# Patient Record
Sex: FEMALE | Race: WHITE | Employment: PART TIME | URBAN - NONMETROPOLITAN AREA
[De-identification: names, ages, dates, MRNs, and addresses within clinical notes are randomized per-mention and may not be internally consistent; named-entity substitution may affect disease eponyms.]

---

## 2017-04-07 DIAGNOSIS — I10 ESSENTIAL HYPERTENSION: ICD-10-CM

## 2017-04-07 RX ORDER — METOPROLOL SUCCINATE 25 MG/1
25 TABLET, EXTENDED RELEASE ORAL DAILY
Qty: 30 TABLET | Refills: 11 | Status: SHIPPED | OUTPATIENT
Start: 2017-04-07 | End: 2018-04-09 | Stop reason: SDUPTHER

## 2017-05-08 DIAGNOSIS — I10 ESSENTIAL HYPERTENSION: ICD-10-CM

## 2017-05-08 RX ORDER — DICLOFENAC SODIUM 75 MG/1
75 TABLET, DELAYED RELEASE ORAL 2 TIMES DAILY
Qty: 60 TABLET | Refills: 3 | Status: SHIPPED | OUTPATIENT
Start: 2017-05-08 | End: 2017-12-19 | Stop reason: SDUPTHER

## 2017-05-08 RX ORDER — LOSARTAN POTASSIUM AND HYDROCHLOROTHIAZIDE 25; 100 MG/1; MG/1
1 TABLET ORAL DAILY
Qty: 30 TABLET | Refills: 11 | Status: SHIPPED | OUTPATIENT
Start: 2017-05-08 | End: 2018-05-10 | Stop reason: SDUPTHER

## 2018-03-22 ENCOUNTER — OFFICE VISIT (OUTPATIENT)
Dept: FAMILY MEDICINE CLINIC | Age: 48
End: 2018-03-22
Payer: COMMERCIAL

## 2018-03-22 VITALS
RESPIRATION RATE: 14 BRPM | HEIGHT: 65 IN | DIASTOLIC BLOOD PRESSURE: 68 MMHG | SYSTOLIC BLOOD PRESSURE: 119 MMHG | HEART RATE: 110 BPM

## 2018-03-22 DIAGNOSIS — S83.282A TEAR OF LATERAL MENISCUS OF LEFT KNEE, CURRENT, UNSPECIFIED TEAR TYPE, INITIAL ENCOUNTER: Primary | ICD-10-CM

## 2018-03-22 PROCEDURE — 99213 OFFICE O/P EST LOW 20 MIN: CPT | Performed by: NURSE PRACTITIONER

## 2018-03-22 ASSESSMENT — ENCOUNTER SYMPTOMS
GASTROINTESTINAL NEGATIVE: 1
RESPIRATORY NEGATIVE: 1
EYES NEGATIVE: 1

## 2018-03-22 ASSESSMENT — PATIENT HEALTH QUESTIONNAIRE - PHQ9
2. FEELING DOWN, DEPRESSED OR HOPELESS: 0
SUM OF ALL RESPONSES TO PHQ9 QUESTIONS 1 & 2: 0
1. LITTLE INTEREST OR PLEASURE IN DOING THINGS: 0
SUM OF ALL RESPONSES TO PHQ QUESTIONS 1-9: 0

## 2018-03-22 NOTE — PROGRESS NOTES
Normocephalic. Right Ear: Tympanic membrane and external ear normal.   Left Ear: Tympanic membrane and external ear normal.   Nose: Nose normal.   Mouth/Throat: Oropharynx is clear and moist.   Neck: Normal range of motion. Neck supple. Cardiovascular: Normal rate, regular rhythm, normal heart sounds and intact distal pulses. Exam reveals no gallop and no friction rub. No murmur heard. Pulmonary/Chest: Effort normal and breath sounds normal. She has no wheezes. She has no rales. Abdominal: Soft. Bowel sounds are normal. There is no tenderness. There is no guarding. Musculoskeletal:        Left knee: She exhibits decreased range of motion and abnormal meniscus. Tenderness found. Lateral joint line tenderness noted. Lymphadenopathy:     She has no cervical adenopathy. Neurological: She is alert and oriented to person, place, and time. She has normal reflexes. Skin: Skin is warm. Psychiatric: She has a normal mood and affect. Assessment:      1.  Tear of lateral meniscus of left knee, current, unspecified tear type, initial encounter  MRI KNEE LEFT WO CONTRAST           Plan:      Suspect lateral meniscal tear  Will get mri and take corrective action  Discussed knee brace, ice, crutches

## 2018-04-02 ENCOUNTER — TELEPHONE (OUTPATIENT)
Dept: FAMILY MEDICINE CLINIC | Age: 48
End: 2018-04-02

## 2018-04-02 DIAGNOSIS — M25.569 ACUTE KNEE PAIN, UNSPECIFIED LATERALITY: Primary | ICD-10-CM

## 2018-04-02 RX ORDER — TRAMADOL HYDROCHLORIDE 50 MG/1
50 TABLET ORAL EVERY 6 HOURS PRN
Qty: 24 TABLET | Refills: 0 | Status: SHIPPED | OUTPATIENT
Start: 2018-04-02 | End: 2018-05-10 | Stop reason: SDUPTHER

## 2018-04-03 ENCOUNTER — TELEPHONE (OUTPATIENT)
Dept: FAMILY MEDICINE CLINIC | Age: 48
End: 2018-04-03

## 2018-04-05 ENCOUNTER — HOSPITAL ENCOUNTER (OUTPATIENT)
Dept: MRI IMAGING | Age: 48
Discharge: HOME | End: 2018-04-05
Payer: COMMERCIAL

## 2018-04-05 ENCOUNTER — TELEPHONE (OUTPATIENT)
Dept: FAMILY MEDICINE CLINIC | Age: 48
End: 2018-04-05

## 2018-04-05 DIAGNOSIS — S80.02XA CONTUSION OF LEFT KNEE, INITIAL ENCOUNTER: ICD-10-CM

## 2018-04-05 DIAGNOSIS — S83.282A TEAR OF LATERAL MENISCUS OF LEFT KNEE, CURRENT, UNSPECIFIED TEAR TYPE, INITIAL ENCOUNTER: ICD-10-CM

## 2018-04-05 DIAGNOSIS — M25.562 ARTHRALGIA OF KNEE, LEFT: Primary | ICD-10-CM

## 2018-04-05 PROCEDURE — 73721 MRI JNT OF LWR EXTRE W/O DYE: CPT

## 2018-04-09 DIAGNOSIS — I10 ESSENTIAL HYPERTENSION: ICD-10-CM

## 2018-04-09 RX ORDER — METOPROLOL SUCCINATE 25 MG/1
TABLET, EXTENDED RELEASE ORAL
Qty: 30 TABLET | Refills: 0 | Status: SHIPPED | OUTPATIENT
Start: 2018-04-09 | End: 2018-05-10 | Stop reason: SDUPTHER

## 2018-04-10 ENCOUNTER — HOSPITAL ENCOUNTER (OUTPATIENT)
Age: 48
Discharge: HOME | End: 2018-04-10
Payer: COMMERCIAL

## 2018-04-10 PROCEDURE — 87205 SMEAR GRAM STAIN: CPT

## 2018-04-10 PROCEDURE — 87070 CULTURE OTHR SPECIMN AEROBIC: CPT

## 2018-04-12 LAB
AEROBIC CULTURE: NORMAL
GRAM STAIN RESULT: NORMAL

## 2018-04-23 ENCOUNTER — HOSPITAL ENCOUNTER (OUTPATIENT)
Dept: MAMMOGRAPHY | Age: 48
Discharge: HOME | End: 2018-04-23
Payer: COMMERCIAL

## 2018-04-23 DIAGNOSIS — Z12.39 SCREENING BREAST EXAMINATION: ICD-10-CM

## 2018-04-23 PROCEDURE — 77063 BREAST TOMOSYNTHESIS BI: CPT

## 2018-05-08 ENCOUNTER — HOSPITAL ENCOUNTER (OUTPATIENT)
Age: 48
Discharge: HOME | End: 2018-05-08
Payer: COMMERCIAL

## 2018-05-08 ENCOUNTER — HOSPITAL ENCOUNTER (OUTPATIENT)
Dept: GENERAL RADIOLOGY | Age: 48
Discharge: HOME | End: 2018-05-08
Payer: COMMERCIAL

## 2018-05-08 DIAGNOSIS — M25.562 LEFT KNEE PAIN, UNSPECIFIED CHRONICITY: ICD-10-CM

## 2018-05-08 PROCEDURE — 73564 X-RAY EXAM KNEE 4 OR MORE: CPT

## 2018-05-10 ENCOUNTER — TELEPHONE (OUTPATIENT)
Dept: FAMILY MEDICINE CLINIC | Age: 48
End: 2018-05-10

## 2018-05-10 DIAGNOSIS — M25.569 ACUTE KNEE PAIN, UNSPECIFIED LATERALITY: ICD-10-CM

## 2018-05-10 DIAGNOSIS — I10 ESSENTIAL HYPERTENSION: ICD-10-CM

## 2018-05-10 RX ORDER — TRAMADOL HYDROCHLORIDE 50 MG/1
50 TABLET ORAL EVERY 6 HOURS PRN
Qty: 24 TABLET | Refills: 0 | Status: SHIPPED | OUTPATIENT
Start: 2018-05-10 | End: 2018-05-20

## 2018-05-10 RX ORDER — LOSARTAN POTASSIUM AND HYDROCHLOROTHIAZIDE 25; 100 MG/1; MG/1
TABLET ORAL
Qty: 30 TABLET | Refills: 0 | Status: SHIPPED | OUTPATIENT
Start: 2018-05-10 | End: 2018-06-20 | Stop reason: SDUPTHER

## 2018-05-10 RX ORDER — DICLOFENAC SODIUM 75 MG/1
TABLET, DELAYED RELEASE ORAL
Qty: 60 TABLET | Refills: 0 | Status: SHIPPED | OUTPATIENT
Start: 2018-05-10 | End: 2018-06-28 | Stop reason: SDUPTHER

## 2018-05-10 RX ORDER — METOPROLOL SUCCINATE 25 MG/1
TABLET, EXTENDED RELEASE ORAL
Qty: 30 TABLET | Refills: 0 | Status: SHIPPED | OUTPATIENT
Start: 2018-05-10 | End: 2018-06-20 | Stop reason: SDUPTHER

## 2018-06-28 RX ORDER — DICLOFENAC SODIUM 75 MG/1
TABLET, DELAYED RELEASE ORAL
Qty: 60 TABLET | Refills: 5 | Status: SHIPPED | OUTPATIENT
Start: 2018-06-28 | End: 2019-06-10 | Stop reason: SDUPTHER

## 2018-06-28 RX ORDER — DICLOFENAC SODIUM 75 MG/1
TABLET, DELAYED RELEASE ORAL
Qty: 60 TABLET | Refills: 0 | Status: SHIPPED | OUTPATIENT
Start: 2018-06-28 | End: 2021-07-01

## 2018-12-13 ENCOUNTER — HOSPITAL ENCOUNTER (EMERGENCY)
Age: 48
Discharge: HOME | End: 2018-12-14
Attending: EMERGENCY MEDICINE
Payer: COMMERCIAL

## 2018-12-13 DIAGNOSIS — R10.9 ABDOMINAL PAIN, UNSPECIFIED ABDOMINAL LOCATION: Primary | ICD-10-CM

## 2018-12-13 DIAGNOSIS — M54.50 ACUTE BILATERAL LOW BACK PAIN WITHOUT SCIATICA: ICD-10-CM

## 2018-12-13 PROCEDURE — 99284 EMERGENCY DEPT VISIT MOD MDM: CPT

## 2018-12-13 PROCEDURE — 2709999900 HC NON-CHARGEABLE SUPPLY

## 2018-12-13 RX ORDER — KETOROLAC TROMETHAMINE 30 MG/ML
15 INJECTION, SOLUTION INTRAMUSCULAR; INTRAVENOUS ONCE
Status: COMPLETED | OUTPATIENT
Start: 2018-12-14 | End: 2018-12-14

## 2018-12-13 RX ORDER — FOLIC ACID 1 MG/1
1 TABLET ORAL DAILY
COMMUNITY

## 2018-12-13 RX ORDER — ONDANSETRON 2 MG/ML
4 INJECTION INTRAMUSCULAR; INTRAVENOUS ONCE
Status: COMPLETED | OUTPATIENT
Start: 2018-12-14 | End: 2018-12-14

## 2018-12-13 ASSESSMENT — PAIN DESCRIPTION - ORIENTATION: ORIENTATION: LOWER

## 2018-12-13 ASSESSMENT — PAIN SCALES - GENERAL: PAINLEVEL_OUTOF10: 8

## 2018-12-13 ASSESSMENT — PAIN DESCRIPTION - LOCATION: LOCATION: BACK

## 2018-12-13 ASSESSMENT — PAIN DESCRIPTION - PAIN TYPE: TYPE: ACUTE PAIN

## 2018-12-14 ENCOUNTER — APPOINTMENT (OUTPATIENT)
Dept: CT IMAGING | Age: 48
End: 2018-12-14
Payer: COMMERCIAL

## 2018-12-14 VITALS
BODY MASS INDEX: 36.65 KG/M2 | SYSTOLIC BLOOD PRESSURE: 121 MMHG | DIASTOLIC BLOOD PRESSURE: 62 MMHG | OXYGEN SATURATION: 95 % | HEIGHT: 65 IN | WEIGHT: 220 LBS | TEMPERATURE: 97.8 F | HEART RATE: 87 BPM | RESPIRATION RATE: 16 BRPM

## 2018-12-14 LAB
ALBUMIN SERPL-MCNC: 3.5 GM/DL (ref 3.4–5)
ALP BLD-CCNC: 77 U/L (ref 46–116)
ALT SERPL-CCNC: 28 U/L (ref 14–63)
AMPHETAMINE+METHAMPHETAMIE: NEGATIVE
ANION GAP: 13 MEQ/L (ref 8–16)
AST SERPL-CCNC: 20 U/L (ref 15–37)
BARBITURATES: NEGATIVE
BASOPHILS # BLD: 0.6 % (ref 0–3)
BENZODIAZEPINES: NEGATIVE
BILIRUB SERPL-MCNC: 0.1 MG/DL (ref 0.2–1)
BILIRUBIN URINE: NEGATIVE
BLOOD, URINE: NEGATIVE
BUN BLDV-MCNC: 15 MG/DL (ref 7–18)
CANNABINOIDS: NEGATIVE
CHARACTER, URINE: CLEAR
CHLORIDE BLD-SCNC: 97 MEQ/L (ref 98–107)
CO2: 26 MEQ/L (ref 21–32)
COCAINE METABOLITE: NEGATIVE
COLOR: YELLOW
CREAT SERPL-MCNC: 0.9 MG/DL (ref 0.6–1.3)
EOSINOPHILS RELATIVE PERCENT: 2.5 % (ref 0–4)
GFR, ESTIMATED: 71 ML/MIN/1.73M2
GLUCOSE BLD-MCNC: 142 MG/DL (ref 74–106)
GLUCOSE, URINE: NEGATIVE MG/DL
HCT VFR BLD CALC: 34.9 % (ref 37–47)
HEMOGLOBIN: 11.8 GM/DL (ref 12–16)
KETONES, URINE: NEGATIVE
LEUKOCYTE ESTERASE, URINE: NEGATIVE
LIPASE: 169 U/L (ref 73–393)
LYMPHOCYTES # BLD: 34 % (ref 15–47)
MCH RBC QN AUTO: 28 PG (ref 27–31)
MCHC RBC AUTO-ENTMCNC: 33.9 GM/DL (ref 33–37)
MCV RBC AUTO: 82.5 FL (ref 81–99)
MONOCYTES: 6 % (ref 0–12)
NITRITE, URINE: NEGATIVE
OPIATES: NEGATIVE
PDW BLD-RTO: 14.5 % (ref 11.5–14.5)
PH UA: 7 (ref 5–9)
PHENCYCLIDINE: NEGATIVE
PLATELET # BLD: 327 THOU/MM3 (ref 130–400)
PMV BLD AUTO: 7.6 FL (ref 7.4–10.4)
POC CALCIUM: 9.2 MG/DL (ref 8.5–10.1)
POTASSIUM SERPL-SCNC: 3.7 MEQ/L (ref 3.5–5.1)
PREGNANCY, URINE: NEGATIVE
PROTEIN UA: NEGATIVE MG/DL
RBC # BLD: 4.23 MILL/MM3 (ref 4.2–5.4)
REFLEX TO URINE C & S: NORMAL
SEGS: 56.9 % (ref 43–75)
SODIUM BLD-SCNC: 136 MEQ/L (ref 136–145)
SPECIFIC GRAVITY UA: 1.02 (ref 1–1.03)
TOTAL PROTEIN: 7.5 GM/DL (ref 6.4–8.2)
UROBILINOGEN, URINE: 0.2 EU/DL (ref 0–1)
WBC # BLD: 7.6 THOU/MM3 (ref 4.8–10.8)

## 2018-12-14 PROCEDURE — 81001 URINALYSIS AUTO W/SCOPE: CPT

## 2018-12-14 PROCEDURE — 36415 COLL VENOUS BLD VENIPUNCTURE: CPT

## 2018-12-14 PROCEDURE — 6360000002 HC RX W HCPCS: Performed by: EMERGENCY MEDICINE

## 2018-12-14 PROCEDURE — 85025 COMPLETE CBC W/AUTO DIFF WBC: CPT

## 2018-12-14 PROCEDURE — 96374 THER/PROPH/DIAG INJ IV PUSH: CPT

## 2018-12-14 PROCEDURE — 6370000000 HC RX 637 (ALT 250 FOR IP): Performed by: EMERGENCY MEDICINE

## 2018-12-14 PROCEDURE — 96375 TX/PRO/DX INJ NEW DRUG ADDON: CPT

## 2018-12-14 PROCEDURE — 83690 ASSAY OF LIPASE: CPT

## 2018-12-14 PROCEDURE — 2709999900 HC NON-CHARGEABLE SUPPLY

## 2018-12-14 PROCEDURE — 84703 CHORIONIC GONADOTROPIN ASSAY: CPT

## 2018-12-14 PROCEDURE — 74176 CT ABD & PELVIS W/O CONTRAST: CPT

## 2018-12-14 PROCEDURE — 80305 DRUG TEST PRSMV DIR OPT OBS: CPT

## 2018-12-14 PROCEDURE — 80053 COMPREHEN METABOLIC PANEL: CPT

## 2018-12-14 RX ORDER — ONDANSETRON 2 MG/ML
4 INJECTION INTRAMUSCULAR; INTRAVENOUS ONCE
Status: COMPLETED | OUTPATIENT
Start: 2018-12-14 | End: 2018-12-14

## 2018-12-14 RX ORDER — MORPHINE SULFATE 4 MG/ML
4 INJECTION, SOLUTION INTRAMUSCULAR; INTRAVENOUS
Status: DISCONTINUED | OUTPATIENT
Start: 2018-12-14 | End: 2018-12-14 | Stop reason: HOSPADM

## 2018-12-14 RX ADMIN — ONDANSETRON HYDROCHLORIDE 4 MG: 2 SOLUTION INTRAMUSCULAR; INTRAVENOUS at 00:22

## 2018-12-14 RX ADMIN — MORPHINE SULFATE 4 MG: 4 INJECTION, SOLUTION INTRAMUSCULAR; INTRAVENOUS at 00:53

## 2018-12-14 RX ADMIN — ONDANSETRON 4 MG: 2 INJECTION INTRAMUSCULAR; INTRAVENOUS at 00:51

## 2018-12-14 RX ADMIN — KETOROLAC TROMETHAMINE 15 MG: 30 INJECTION, SOLUTION INTRAMUSCULAR at 00:24

## 2018-12-14 ASSESSMENT — PAIN SCALES - GENERAL
PAINLEVEL_OUTOF10: 6
PAINLEVEL_OUTOF10: 7

## 2018-12-14 NOTE — ED PROVIDER NOTES
and results. .  Patient was reassessed and condition was improved after tx. No further needs at this time. Lab tests are unremarkable. Urinalysis is negative. Urine drug screen is negative. Pregnancy is negative. White count is normal.  Lipase is normal.  Clinical exam is otherwise benign at this time. She still has some pain and discomfort although appears more like a kidney stone type pain or discomfort although really has no CVA tenderness or other upper belly pain. CAT scan was obtained and is negative for bowel obstruction or any acute abnormalities. She's received some morphine IV here she is almost 100 percent improved at this time. 1:53 AM  Discussed with patient and family at the bedside in regards to differential diagnosis including for her upper pain. She had no chest pain or shortness of breath to suggest ischemia or other findings at this time. Discussed upper abdominal pain such as gallbladder disease or spasm. Would account for earlier upper pain and discomfort although not low back pain or other problems although can radiate and refer pain. I recommended a gallbladder ultrasound as an outpatient. I'll place an order and have follow-up with primary care. FINAL IMPRESSION    1. Abdominal pain, unspecified abdominal location    2.  Acute bilateral low back pain without sciatica         PATIENT REFERRED TO:  [facility], APRN - CNP  [address]  [phone]    Call   For evaluation      DISCHARGE MEDICATIONS:  New Prescriptions    No medications on file          Bucky Mark MD  12/14/18 4350

## 2019-01-28 DIAGNOSIS — I10 ESSENTIAL HYPERTENSION: ICD-10-CM

## 2019-01-28 RX ORDER — LOSARTAN POTASSIUM AND HYDROCHLOROTHIAZIDE 25; 100 MG/1; MG/1
TABLET ORAL
Qty: 30 TABLET | Refills: 0 | Status: SHIPPED | OUTPATIENT
Start: 2019-01-28 | End: 2019-03-07 | Stop reason: SDUPTHER

## 2019-01-28 RX ORDER — METOPROLOL SUCCINATE 25 MG/1
TABLET, EXTENDED RELEASE ORAL
Qty: 30 TABLET | Refills: 0 | Status: SHIPPED | OUTPATIENT
Start: 2019-01-28 | End: 2019-03-07 | Stop reason: SDUPTHER

## 2019-03-07 DIAGNOSIS — I10 ESSENTIAL HYPERTENSION: ICD-10-CM

## 2019-03-07 RX ORDER — LOSARTAN POTASSIUM AND HYDROCHLOROTHIAZIDE 25; 100 MG/1; MG/1
TABLET ORAL
Qty: 30 TABLET | Refills: 0 | Status: SHIPPED | OUTPATIENT
Start: 2019-03-07 | End: 2019-04-05 | Stop reason: SDUPTHER

## 2019-03-07 RX ORDER — METOPROLOL SUCCINATE 25 MG/1
TABLET, EXTENDED RELEASE ORAL
Qty: 30 TABLET | Refills: 0 | Status: SHIPPED | OUTPATIENT
Start: 2019-03-07 | End: 2019-04-05 | Stop reason: SDUPTHER

## 2019-04-29 LAB
ALT SERPL-CCNC: 23 U/L
AST SERPL-CCNC: 25 U/L
BASOPHILS ABSOLUTE: ABNORMAL /ΜL
BASOPHILS RELATIVE PERCENT: ABNORMAL %
CREAT SERPL-MCNC: 0.79 MG/DL
EOSINOPHILS ABSOLUTE: ABNORMAL /ΜL
EOSINOPHILS RELATIVE PERCENT: ABNORMAL %
HCT VFR BLD CALC: 35.4 % (ref 36–46)
HEMOGLOBIN: 11.6 G/DL (ref 12–16)
LYMPHOCYTES ABSOLUTE: ABNORMAL /ΜL
LYMPHOCYTES RELATIVE PERCENT: ABNORMAL %
MCH RBC QN AUTO: 27.2 PG
MCHC RBC AUTO-ENTMCNC: 32.7 G/DL
MCV RBC AUTO: 83.2 FL
MONOCYTES ABSOLUTE: ABNORMAL /ΜL
MONOCYTES RELATIVE PERCENT: ABNORMAL %
NEUTROPHILS ABSOLUTE: ABNORMAL /ΜL
NEUTROPHILS RELATIVE PERCENT: ABNORMAL %
PLATELET # BLD: 318 K/ΜL
PMV BLD AUTO: ABNORMAL FL
RBC # BLD: 4.26 10^6/ΜL
WBC # BLD: 6.1 10^3/ML

## 2019-05-13 ENCOUNTER — HOSPITAL ENCOUNTER (OUTPATIENT)
Dept: MAMMOGRAPHY | Age: 49
Discharge: HOME | End: 2019-05-13
Payer: COMMERCIAL

## 2019-05-13 DIAGNOSIS — Z12.39 SCREENING BREAST EXAMINATION: ICD-10-CM

## 2019-05-13 PROCEDURE — 77063 BREAST TOMOSYNTHESIS BI: CPT

## 2019-05-23 ENCOUNTER — HOSPITAL ENCOUNTER (OUTPATIENT)
Dept: WOMENS IMAGING | Age: 49
Discharge: HOME | End: 2019-05-23
Payer: COMMERCIAL

## 2019-05-23 DIAGNOSIS — R92.0 BREAST MICROCALCIFICATIONS: ICD-10-CM

## 2019-05-23 PROCEDURE — G0279 TOMOSYNTHESIS, MAMMO: HCPCS

## 2019-06-10 RX ORDER — DICLOFENAC SODIUM 75 MG/1
TABLET, DELAYED RELEASE ORAL
Qty: 60 TABLET | Refills: 0 | Status: SHIPPED | OUTPATIENT
Start: 2019-06-10 | End: 2019-07-30 | Stop reason: SDUPTHER

## 2019-07-30 RX ORDER — DICLOFENAC SODIUM 75 MG/1
TABLET, DELAYED RELEASE ORAL
Qty: 60 TABLET | Refills: 0 | Status: SHIPPED | OUTPATIENT
Start: 2019-07-30 | End: 2019-12-16

## 2019-11-04 ENCOUNTER — APPOINTMENT (OUTPATIENT)
Dept: WOMENS IMAGING | Age: 49
End: 2019-11-04
Payer: COMMERCIAL

## 2019-11-04 ENCOUNTER — HOSPITAL ENCOUNTER (OUTPATIENT)
Dept: WOMENS IMAGING | Age: 49
Discharge: HOME | End: 2019-11-04
Payer: COMMERCIAL

## 2019-11-04 DIAGNOSIS — R92.1 BREAST CALCIFICATION, RIGHT: ICD-10-CM

## 2019-11-04 DIAGNOSIS — Z09 FOLLOW-UP EXAM: ICD-10-CM

## 2019-11-04 PROCEDURE — G0279 TOMOSYNTHESIS, MAMMO: HCPCS

## 2019-11-18 ENCOUNTER — NURSE ONLY (OUTPATIENT)
Dept: LAB | Age: 49
End: 2019-11-18

## 2019-11-18 LAB
ALBUMIN SERPL-MCNC: 4.3 G/DL (ref 3.5–5.1)
ALP BLD-CCNC: 65 U/L (ref 38–126)
ALT SERPL-CCNC: 32 U/L (ref 11–66)
ANION GAP SERPL CALCULATED.3IONS-SCNC: 14 MEQ/L (ref 8–16)
AST SERPL-CCNC: 24 U/L (ref 5–40)
BASOPHILS # BLD: 0.3 %
BASOPHILS ABSOLUTE: 0 THOU/MM3 (ref 0–0.1)
BILIRUB SERPL-MCNC: 0.3 MG/DL (ref 0.3–1.2)
BUN BLDV-MCNC: 10 MG/DL (ref 7–22)
C-REACTIVE PROTEIN: 0.54 MG/DL (ref 0–1)
CALCIUM SERPL-MCNC: 9.4 MG/DL (ref 8.5–10.5)
CHLORIDE BLD-SCNC: 99 MEQ/L (ref 98–111)
CO2: 23 MEQ/L (ref 23–33)
CREAT SERPL-MCNC: 0.6 MG/DL (ref 0.4–1.2)
EOSINOPHIL # BLD: 3.2 %
EOSINOPHILS ABSOLUTE: 0.2 THOU/MM3 (ref 0–0.4)
ERYTHROCYTE [DISTWIDTH] IN BLOOD BY AUTOMATED COUNT: 14 % (ref 11.5–14.5)
ERYTHROCYTE [DISTWIDTH] IN BLOOD BY AUTOMATED COUNT: 44.7 FL (ref 35–45)
GFR SERPL CREATININE-BSD FRML MDRD: > 90 ML/MIN/1.73M2
HBV SURFACE AB TITR SER: NEGATIVE {TITER}
HCT VFR BLD CALC: 39.5 % (ref 37–47)
HEMOGLOBIN: 13 GM/DL (ref 12–16)
HEPATITIS B SURFACE ANTIGEN: NEGATIVE
HEPATITIS C ANTIBODY: NEGATIVE
IMMATURE GRANS (ABS): 0.01 THOU/MM3 (ref 0–0.07)
IMMATURE GRANULOCYTES: 0.2 %
LYMPHOCYTES # BLD: 41.2 %
LYMPHOCYTES ABSOLUTE: 2.5 THOU/MM3 (ref 1–4.8)
MCH RBC QN AUTO: 29 PG (ref 26–33)
MCHC RBC AUTO-ENTMCNC: 32.9 GM/DL (ref 32.2–35.5)
MCV RBC AUTO: 88.2 FL (ref 81–99)
MONOCYTES # BLD: 5.1 %
MONOCYTES ABSOLUTE: 0.3 THOU/MM3 (ref 0.4–1.3)
NUCLEATED RED BLOOD CELLS: 0 /100 WBC
PLATELET # BLD: 280 THOU/MM3 (ref 130–400)
PMV BLD AUTO: 9.9 FL (ref 9.4–12.4)
POTASSIUM SERPL-SCNC: 4 MEQ/L (ref 3.5–5.2)
RBC # BLD: 4.48 MILL/MM3 (ref 4.2–5.4)
RHEUMATOID FACTOR: < 10 IU/ML (ref 0–13)
SEDIMENTATION RATE, ERYTHROCYTE: 18 MM/HR (ref 0–20)
SEG NEUTROPHILS: 50 %
SEGMENTED NEUTROPHILS ABSOLUTE COUNT: 3 THOU/MM3 (ref 1.8–7.7)
SODIUM BLD-SCNC: 136 MEQ/L (ref 135–145)
TOTAL PROTEIN: 7.5 G/DL (ref 6.1–8)
URIC ACID: 6.8 MG/DL (ref 2.4–5.7)
WBC # BLD: 6 THOU/MM3 (ref 4.8–10.8)

## 2019-11-19 ENCOUNTER — NURSE ONLY (OUTPATIENT)
Dept: FAMILY MEDICINE CLINIC | Age: 49
End: 2019-11-19
Payer: COMMERCIAL

## 2019-11-19 VITALS — TEMPERATURE: 98.6 F

## 2019-11-19 DIAGNOSIS — J02.9 SORE THROAT: Primary | ICD-10-CM

## 2019-11-19 LAB — STREPTOCOCCUS A RNA: NEGATIVE

## 2019-11-19 PROCEDURE — 87651 STREP A DNA AMP PROBE: CPT | Performed by: NURSE PRACTITIONER

## 2019-11-21 LAB
ANA SCREEN: NORMAL
CYCLIC CITRULLIN PEPTIDE AB: 3 UNITS (ref 0–19)
HEPATITIS B CORE TOTAL ANTIBODY: NEGATIVE

## 2019-12-16 ENCOUNTER — OFFICE VISIT (OUTPATIENT)
Dept: FAMILY MEDICINE CLINIC | Age: 49
End: 2019-12-16
Payer: COMMERCIAL

## 2019-12-16 ENCOUNTER — HOSPITAL ENCOUNTER (OUTPATIENT)
Age: 49
Discharge: HOME | End: 2019-12-16
Payer: COMMERCIAL

## 2019-12-16 ENCOUNTER — HOSPITAL ENCOUNTER (OUTPATIENT)
Dept: GENERAL RADIOLOGY | Age: 49
Discharge: HOME | End: 2019-12-16
Payer: COMMERCIAL

## 2019-12-16 VITALS
TEMPERATURE: 97.7 F | HEIGHT: 65 IN | BODY MASS INDEX: 39.95 KG/M2 | RESPIRATION RATE: 16 BRPM | HEART RATE: 72 BPM | DIASTOLIC BLOOD PRESSURE: 80 MMHG | SYSTOLIC BLOOD PRESSURE: 130 MMHG | WEIGHT: 239.8 LBS

## 2019-12-16 DIAGNOSIS — M17.0 ARTHRITIS OF BOTH KNEES: ICD-10-CM

## 2019-12-16 DIAGNOSIS — Z79.899 ENCOUNTER FOR LONG-TERM (CURRENT) USE OF OTHER MEDICATIONS: ICD-10-CM

## 2019-12-16 DIAGNOSIS — R10.11 RUQ ABDOMINAL PAIN: ICD-10-CM

## 2019-12-16 DIAGNOSIS — M06.09 RHEUMATOID ARTHRITIS, SERONEGATIVE, MULTIPLE SITES (HCC): ICD-10-CM

## 2019-12-16 DIAGNOSIS — R10.84 GENERALIZED ABDOMINAL PAIN: Primary | ICD-10-CM

## 2019-12-16 LAB
BILIRUBIN URINE: NEGATIVE
BLOOD URINE, POC: NEGATIVE
CHARACTER, URINE: CLEAR
COLOR, URINE: YELLOW
GLUCOSE URINE: NEGATIVE MG/DL
KETONES, URINE: NEGATIVE
LEUKOCYTE CLUMPS, URINE: ABNORMAL
NITRITE, URINE: NEGATIVE
PH, URINE: 5.5 (ref 5–9)
PROTEIN, URINE: NEGATIVE MG/DL
SPECIFIC GRAVITY, URINE: 1.02 (ref 1–1.03)
UROBILINOGEN, URINE: 0.2 EU/DL (ref 0–1)

## 2019-12-16 PROCEDURE — 71046 X-RAY EXAM CHEST 2 VIEWS: CPT

## 2019-12-16 PROCEDURE — 99213 OFFICE O/P EST LOW 20 MIN: CPT | Performed by: NURSE PRACTITIONER

## 2019-12-16 PROCEDURE — 81003 URINALYSIS AUTO W/O SCOPE: CPT | Performed by: NURSE PRACTITIONER

## 2019-12-16 RX ORDER — CIPROFLOXACIN 250 MG/1
250 TABLET, FILM COATED ORAL 2 TIMES DAILY
Qty: 14 TABLET | Refills: 0 | Status: SHIPPED | OUTPATIENT
Start: 2019-12-16 | End: 2019-12-23

## 2019-12-16 SDOH — ECONOMIC STABILITY: FOOD INSECURITY: WITHIN THE PAST 12 MONTHS, THE FOOD YOU BOUGHT JUST DIDN'T LAST AND YOU DIDN'T HAVE MONEY TO GET MORE.: NEVER TRUE

## 2019-12-16 SDOH — ECONOMIC STABILITY: FOOD INSECURITY: WITHIN THE PAST 12 MONTHS, YOU WORRIED THAT YOUR FOOD WOULD RUN OUT BEFORE YOU GOT MONEY TO BUY MORE.: NEVER TRUE

## 2019-12-16 SDOH — ECONOMIC STABILITY: INCOME INSECURITY: HOW HARD IS IT FOR YOU TO PAY FOR THE VERY BASICS LIKE FOOD, HOUSING, MEDICAL CARE, AND HEATING?: NOT HARD AT ALL

## 2019-12-16 ASSESSMENT — PATIENT HEALTH QUESTIONNAIRE - PHQ9
2. FEELING DOWN, DEPRESSED OR HOPELESS: 0
1. LITTLE INTEREST OR PLEASURE IN DOING THINGS: 0
SUM OF ALL RESPONSES TO PHQ QUESTIONS 1-9: 0
SUM OF ALL RESPONSES TO PHQ QUESTIONS 1-9: 0
SUM OF ALL RESPONSES TO PHQ9 QUESTIONS 1 & 2: 0

## 2019-12-16 ASSESSMENT — ENCOUNTER SYMPTOMS
ABDOMINAL PAIN: 1
NAUSEA: 1
RESPIRATORY NEGATIVE: 1
EYES NEGATIVE: 1

## 2019-12-18 LAB
ORGANISM: ABNORMAL
URINE CULTURE, ROUTINE: ABNORMAL

## 2019-12-23 ENCOUNTER — TELEPHONE (OUTPATIENT)
Dept: FAMILY MEDICINE CLINIC | Age: 49
End: 2019-12-23

## 2019-12-23 ENCOUNTER — HOSPITAL ENCOUNTER (OUTPATIENT)
Dept: ULTRASOUND IMAGING | Age: 49
Discharge: HOME | End: 2019-12-23
Payer: COMMERCIAL

## 2019-12-23 DIAGNOSIS — R10.11 RUQ ABDOMINAL PAIN: ICD-10-CM

## 2019-12-23 DIAGNOSIS — K80.20 CALCULUS OF GALLBLADDER WITHOUT CHOLECYSTITIS WITHOUT OBSTRUCTION: ICD-10-CM

## 2019-12-23 DIAGNOSIS — R10.11 RUQ ABDOMINAL PAIN: Primary | ICD-10-CM

## 2019-12-23 PROCEDURE — 76705 ECHO EXAM OF ABDOMEN: CPT

## 2020-01-13 ENCOUNTER — HOSPITAL ENCOUNTER (OUTPATIENT)
Age: 50
Discharge: HOME | End: 2020-01-13
Payer: COMMERCIAL

## 2020-01-13 ENCOUNTER — OFFICE VISIT (OUTPATIENT)
Dept: SURGERY | Age: 50
End: 2020-01-13
Payer: COMMERCIAL

## 2020-01-13 VITALS
HEART RATE: 84 BPM | DIASTOLIC BLOOD PRESSURE: 64 MMHG | WEIGHT: 239.5 LBS | TEMPERATURE: 98.4 F | RESPIRATION RATE: 18 BRPM | HEIGHT: 65 IN | SYSTOLIC BLOOD PRESSURE: 120 MMHG | OXYGEN SATURATION: 96 % | BODY MASS INDEX: 39.9 KG/M2

## 2020-01-13 LAB
ALBUMIN SERPL-MCNC: 4.5 G/DL (ref 3.5–5.1)
ALP BLD-CCNC: 61 U/L (ref 38–126)
ALT SERPL-CCNC: 27 U/L (ref 11–66)
AST SERPL-CCNC: 19 U/L (ref 5–40)
BILIRUB SERPL-MCNC: 0.2 MG/DL (ref 0.3–1.2)
BILIRUBIN DIRECT: < 0.2 MG/DL (ref 0–0.3)
EKG ATRIAL RATE: 74 BPM
EKG P AXIS: 60 DEGREES
EKG P-R INTERVAL: 160 MS
EKG Q-T INTERVAL: 398 MS
EKG QRS DURATION: 80 MS
EKG QTC CALCULATION (BAZETT): 441 MS
EKG R AXIS: 31 DEGREES
EKG T AXIS: 8 DEGREES
EKG VENTRICULAR RATE: 74 BPM
TOTAL PROTEIN: 7.6 G/DL (ref 6.1–8)

## 2020-01-13 PROCEDURE — 99243 OFF/OP CNSLTJ NEW/EST LOW 30: CPT | Performed by: SURGERY

## 2020-01-13 PROCEDURE — 80076 HEPATIC FUNCTION PANEL: CPT

## 2020-01-13 PROCEDURE — 36415 COLL VENOUS BLD VENIPUNCTURE: CPT

## 2020-01-13 PROCEDURE — 93010 ELECTROCARDIOGRAM REPORT: CPT | Performed by: NUCLEAR MEDICINE

## 2020-01-13 PROCEDURE — 93005 ELECTROCARDIOGRAM TRACING: CPT

## 2020-01-13 RX ORDER — PANTOPRAZOLE SODIUM 40 MG/1
40 TABLET, DELAYED RELEASE ORAL
Qty: 30 TABLET | Refills: 0 | Status: SHIPPED | OUTPATIENT
Start: 2020-01-13 | End: 2020-01-30

## 2020-01-13 RX ORDER — MELOXICAM 15 MG/1
15 TABLET ORAL DAILY
COMMUNITY
End: 2020-05-21

## 2020-01-13 NOTE — PROGRESS NOTES
Emili Joseph MD   General Surgery  New Patient Evaluation in Office  Pt Name: Kasandra Echevarria  Date of Birth 1970   Today's Date: 1/13/2020  Medical Record Number: [id number]  Referring Provider: GERALDINE Mcintyre -*  Primary Care Provider: GERALDINE Robledo - CNP  Chief Complaint:  Chief Complaint   Patient presents with   [facility] Surgical Consult     new patient--ref by KAMLA Carter for gallstones       ASSESSMENT      1. Calculus of gallbladder without cholecystitis without obstruction    2. Essential hypertension    3. Pre-op testing    4. Gastroesophageal reflux disease, esophagitis presence not specified         PLANS      1. Schedule patient for robotic assisted laparoscopic cholecystectomy for symptomatic cholelithiasis. 2.  Prescription for Protonix for reflux symptomatology and GI prophylaxis. Patient taking NSAIDs for her rheumatoid arthritis. 3.  Risks of procedure and advantages disadvantages use of robotic technology were discussed. Risks include but not limited to bleeding, infection, bile duct leak, bile duct injury, persistence of symptoms postoperatively, postoperative diarrhea as well as conversion open procedure were all discussed. All questions answered. 4.  General anesthesia  5. Preoperative testing per anesthesia guidelines        Watson Recinos is a 51y.o. year old female who is presenting today in the office for evaluation of cholelithiasis with symptomatic biliary colic symptoms. States he has a history of rheumatoid arthritis she recently had meloxicam added which did start some symptoms of reflux. She has over the last year or so had intermittent episodes of epigastric and right upper quadrant pain with radiation around to the right flank. She denies any real triggers though spicy foods do seem to aggravate her symptoms. She has no known prior history of peptic ulcer disease, pancreatitis or jaundice. She had been taking Nexium over-the-counter.   She has never had an Maternal Grandmother     High Blood Pressure Maternal Grandmother     Diabetes Maternal Grandfather     Heart Disease Maternal Grandfather     High Cholesterol Maternal Grandfather     No Known Problems Brother     Arthritis Paternal Grandmother         RA    Diabetes Paternal Grandfather     Hypertension Paternal Grandfather        SocialHistory  Social History     Socioeconomic History    Marital status:      Spouse name: Not on file    Number of children: 3    Years of education: Not on file    Highest education level: Not on file   Occupational History    Occupation:  and    Social Needs    Financial resource strain: Not hard at all   [employer] insecurity:     Worry: Never true     Inability: Never true   OWM needs:     Medical: Not on file     Non-medical: Not on file   Tobacco Use    Smoking status: Never Smoker    Smokeless tobacco: Never Used   Substance and Sexual Activity    Alcohol use: Yes     Alcohol/week: 0.0 standard drinks     Comment: social    Drug use: No    Sexual activity: Not Currently   Lifestyle    Physical activity:     Days per week: Not on file     Minutes per session: Not on file    Stress: Not on file   Relationships    Social connections:     Talks on phone: Not on file     Gets together: Not on file     Attends Gnosticist service: Not on file     Active member of club or organization: Not on file     Attends meetings of clubs or organizations: Not on file     Relationship status: Not on file    Intimate partner violence:     Fear of current or ex partner: Not on file     Emotionally abused: Not on file     Physically abused: Not on file     Forced sexual activity: Not on file   Other Topics Concern    Not on file   Social History Narrative    Not on file           Review of Systems  Review of Systems   Constitutional: Negative for chills, fatigue, fever and unexpected weight change.    HENT: Negative for sore throat, trouble swallowing and voice change. Eyes: Negative for visual disturbance. Respiratory: Negative for cough, shortness of breath and wheezing. Cardiovascular: Negative for chest pain and palpitations. Gastrointestinal: Positive for abdominal distention, abdominal pain and nausea. Negative for blood in stool and vomiting. Endocrine: Negative for cold intolerance, heat intolerance and polydipsia. Genitourinary: Negative for dysuria, flank pain and hematuria. Musculoskeletal: Positive for arthralgias and myalgias. Negative for gait problem and joint swelling. Skin: Negative for color change and rash. Allergic/Immunologic: Negative for immunocompromised state. Neurological: Negative for dizziness, tremors, seizures and speech difficulty. Hematological: Does not bruise/bleed easily. Psychiatric/Behavioral: Negative for behavioral problems, confusion and suicidal ideas. OBJECTIVE     /64 (Site: Right Upper Arm, Position: Sitting, Cuff Size: Medium Adult)   Pulse 84   Temp 98.4 °F (36.9 °C)   Resp 18   Ht 5' 5\" (1.651 m)   Wt 239 lb 8 oz (108.6 kg)   LMP 12/25/2019   SpO2 96%   BMI 39.85 kg/m²      Physical Exam  Vitals signs reviewed. Constitutional:       Appearance: She is well-developed. She is not ill-appearing or diaphoretic. HENT:      Head: Normocephalic and atraumatic. Nose: No rhinorrhea. Eyes:      General: No scleral icterus. Extraocular Movements: Extraocular movements intact. Pupils: Pupils are equal, round, and reactive to light. Neck:      Musculoskeletal: Normal range of motion and neck supple. Thyroid: No thyromegaly. Vascular: No JVD. Trachea: No tracheal deviation. Cardiovascular:      Rate and Rhythm: Normal rate and regular rhythm. Heart sounds: Normal heart sounds. No murmur. Pulmonary:      Effort: Pulmonary effort is normal. No respiratory distress. Breath sounds: Normal breath sounds. No wheezing.    Abdominal: General: Bowel sounds are normal. There is no distension. Palpations: Abdomen is soft. Tenderness: There is no tenderness. There is no guarding or rebound. Hernia: No hernia is present. Musculoskeletal: Normal range of motion. Lymphadenopathy:      Cervical: No cervical adenopathy. Skin:     General: Skin is warm and dry. Findings: No rash. Neurological:      Mental Status: She is alert and oriented to person, place, and time. Cranial Nerves: No cranial nerve deficit. Lab Results   Component Value Date    WBC 6.0 11/18/2019    HGB 13.0 11/18/2019    HCT 39.5 11/18/2019     11/18/2019    ALT 27 01/13/2020    AST 19 01/13/2020     11/18/2019    K 4.0 11/18/2019    CL 99 11/18/2019    CREATININE 0.6 11/18/2019    BUN 10 11/18/2019    CO2 23 11/18/2019    TSH 1.770 10/06/2015        PROCEDURE: US GALLBLADDER RUQ       CLINICAL INFORMATION: RUQ abdominal pain .       COMPARISON: No prior study.       TECHNIQUE: Grayscale and color Doppler ultrasound       FINDINGS: Numerous echogenic foci within the gallbladder with posterior shadowing compatible with gallstones. There is no wall thickening or para cholecystic edema. Common bile duct is normal caliber. Increased echogenicity of the Liver compatible with    hepatic steatosis. No intrahepatic ductal dilatation is seen.       Gallbladder - 7.2 x 3.6 x 2.2 cm   Gallbladder Wall - 0.2 cm   Common Duct - 0.5 cm   Castañeda's Sign: neg               Impression   Cholelithiasis without evidence of acute cholecystitis.             **This report has been created using voice recognition software.  It may contain minor errors which are inherent in voice recognition technology. **       Final report electronically signed by Dr. Rubi De La Fuente on 12/23/2019 11:35 AM           Images reviewed

## 2020-01-13 NOTE — PATIENT INSTRUCTIONS
Patient Education        Cholecystectomy: Before Your Surgery  What is cholecystectomy? Cholecystectomy (up-tuw-znl-MICHAEL-tuh-minh) is a type of surgery. It removes a diseased gallbladder. This surgery is usually done as a laparoscopic surgery. The doctor puts a lighted tube and other surgical tools through small cuts (incisions) in your belly. The tube is called a scope. It lets your doctor see your organs so he or she can do the surgery. The incisions leave scars that fade with time. Most people go home the same day. You probably will feel better each day. Most people have only a small amount of pain after 1 week. If you have a desk job, you can probably go back to work in 1 to 2 weeks. If you lift heavy objects or have a very active job, it may take up to 4 weeks. In some cases, open surgery is the best choice. Your doctor may choose open surgery in advance. Or he or she may choose it in the middle of laparoscopic surgery. In open surgery, the doctor makes a larger incision in your upper belly. If you have open surgery, you will probably stay in the hospital for 2 to 4 days. And it may take 4 to 6 weeks to get back to your normal routine. Follow-up care is a key part of your treatment and safety. Be sure to make and go to all appointments, and call your doctor if you are having problems. It's also a good idea to know your test results and keep a list of the medicines you take. What happens before surgery?   Surgery can be stressful. This information will help you understand what you can expect. And it will help you safely prepare for surgery.   Preparing for surgery    · Understand exactly what surgery is planned, along with the risks, benefits, and other options. · Tell your doctors ALL the medicines, vitamins, supplements, and herbal remedies you take.  Some of these can increase the risk of bleeding or interact with anesthesia.     · If you take aspirin or some other blood thinner, ask your doctor if you should stop taking it before your surgery. Make sure that you understand exactly what your doctor wants you to do. These medicines increase the risk of bleeding.     · Your doctor will tell you which medicines to take or stop before your surgery. You may need to stop taking certain medicines a week or more before surgery. So talk to your doctor as soon as you can.     · If you have an advance directive, let your doctor know. It may include a living will and a durable power of  for health care. Bring a copy to the hospital. If you don't have one, you may want to prepare one. It lets your doctor and loved ones know your health care wishes. Doctors advise that everyone prepare these papers before any type of surgery or procedure.     · You may need to empty your colon with an enema or laxative. Your doctor will tell you how to do this. What happens on the day of surgery? · Follow the instructions exactly about when to stop eating and drinking. If you don't, your surgery may be canceled. If your doctor told you to take your medicines on the day of surgery, take them with only a sip of water.     · Take a bath or shower before you come in for your surgery. Do not apply lotions, perfumes, deodorants, or nail polish.     · Do not shave the surgical site yourself.     · Take off all jewelry and piercings. And take out contact lenses, if you wear them.    At the hospital or surgery center   · Bring a picture ID.     · The area for surgery is often marked to make sure there are no errors.     · You will be kept comfortable and safe by your anesthesia provider. You will be asleep during the surgery.     · The surgery usually takes 1 to 2 hours. Going home   · Be sure you have someone to drive you home. Anesthesia and pain medicine make it unsafe for you to drive.     · You will be given more specific instructions about recovering from your surgery.  They will cover things like diet, wound care, follow-up care,

## 2020-01-14 ASSESSMENT — ENCOUNTER SYMPTOMS
NAUSEA: 1
ABDOMINAL DISTENTION: 1
COLOR CHANGE: 0
SORE THROAT: 0
SHORTNESS OF BREATH: 0
BLOOD IN STOOL: 0
COUGH: 0
WHEEZING: 0
TROUBLE SWALLOWING: 0
VOICE CHANGE: 0
ABDOMINAL PAIN: 1
VOMITING: 0

## 2020-01-17 ENCOUNTER — ANESTHESIA (OUTPATIENT)
Dept: OPERATING ROOM | Age: 50
End: 2020-01-17
Payer: COMMERCIAL

## 2020-01-17 ENCOUNTER — HOSPITAL ENCOUNTER (OUTPATIENT)
Age: 50
Setting detail: OUTPATIENT SURGERY
Discharge: HOME | End: 2020-01-17
Attending: SURGERY | Admitting: SURGERY
Payer: COMMERCIAL

## 2020-01-17 ENCOUNTER — ANESTHESIA EVENT (OUTPATIENT)
Dept: OPERATING ROOM | Age: 50
End: 2020-01-17
Payer: COMMERCIAL

## 2020-01-17 VITALS
RESPIRATION RATE: 18 BRPM | HEART RATE: 80 BPM | DIASTOLIC BLOOD PRESSURE: 80 MMHG | SYSTOLIC BLOOD PRESSURE: 115 MMHG | WEIGHT: 240.4 LBS | HEIGHT: 65 IN | TEMPERATURE: 98.2 F | BODY MASS INDEX: 40.05 KG/M2 | OXYGEN SATURATION: 96 %

## 2020-01-17 VITALS
TEMPERATURE: 98.1 F | DIASTOLIC BLOOD PRESSURE: 55 MMHG | SYSTOLIC BLOOD PRESSURE: 98 MMHG | OXYGEN SATURATION: 90 % | RESPIRATION RATE: 2 BRPM

## 2020-01-17 LAB — PREGNANCY, URINE: NEGATIVE

## 2020-01-17 PROCEDURE — 81025 URINE PREGNANCY TEST: CPT

## 2020-01-17 PROCEDURE — 7100000001 HC PACU RECOVERY - ADDTL 15 MIN: Performed by: SURGERY

## 2020-01-17 PROCEDURE — 49587 REPAIR UMBILICAL HERN,5+Y/O,STRANG: CPT | Performed by: SURGERY

## 2020-01-17 PROCEDURE — 2780000010 HC IMPLANT OTHER: Performed by: SURGERY

## 2020-01-17 PROCEDURE — 6370000000 HC RX 637 (ALT 250 FOR IP): Performed by: SURGERY

## 2020-01-17 PROCEDURE — 2709999900 HC NON-CHARGEABLE SUPPLY: Performed by: SURGERY

## 2020-01-17 PROCEDURE — 7100000010 HC PHASE II RECOVERY - FIRST 15 MIN: Performed by: SURGERY

## 2020-01-17 PROCEDURE — 2580000003 HC RX 258: Performed by: SURGERY

## 2020-01-17 PROCEDURE — 3600000009 HC SURGERY ROBOT BASE: Performed by: SURGERY

## 2020-01-17 PROCEDURE — S2900 ROBOTIC SURGICAL SYSTEM: HCPCS | Performed by: SURGERY

## 2020-01-17 PROCEDURE — 88304 TISSUE EXAM BY PATHOLOGIST: CPT

## 2020-01-17 PROCEDURE — 6360000002 HC RX W HCPCS: Performed by: REGISTERED NURSE

## 2020-01-17 PROCEDURE — 7100000000 HC PACU RECOVERY - FIRST 15 MIN: Performed by: SURGERY

## 2020-01-17 PROCEDURE — 2500000003 HC RX 250 WO HCPCS: Performed by: SURGERY

## 2020-01-17 PROCEDURE — 7100000011 HC PHASE II RECOVERY - ADDTL 15 MIN: Performed by: SURGERY

## 2020-01-17 PROCEDURE — 3600000019 HC SURGERY ROBOT ADDTL 15MIN: Performed by: SURGERY

## 2020-01-17 PROCEDURE — 2500000003 HC RX 250 WO HCPCS: Performed by: REGISTERED NURSE

## 2020-01-17 PROCEDURE — 3700000001 HC ADD 15 MINUTES (ANESTHESIA): Performed by: SURGERY

## 2020-01-17 PROCEDURE — 3700000000 HC ANESTHESIA ATTENDED CARE: Performed by: SURGERY

## 2020-01-17 PROCEDURE — 6360000002 HC RX W HCPCS: Performed by: SURGERY

## 2020-01-17 PROCEDURE — 47562 LAPAROSCOPIC CHOLECYSTECTOMY: CPT | Performed by: SURGERY

## 2020-01-17 PROCEDURE — 6360000002 HC RX W HCPCS: Performed by: ANESTHESIOLOGY

## 2020-01-17 DEVICE — Z DUP USE 2641840 CLIP INT L POLYMER LOK LIG HEM O LOK: Type: IMPLANTABLE DEVICE | Status: FUNCTIONAL

## 2020-01-17 RX ORDER — MEPERIDINE HYDROCHLORIDE 25 MG/ML
12.5 INJECTION INTRAMUSCULAR; INTRAVENOUS; SUBCUTANEOUS EVERY 5 MIN PRN
Status: DISCONTINUED | OUTPATIENT
Start: 2020-01-17 | End: 2020-01-17 | Stop reason: HOSPADM

## 2020-01-17 RX ORDER — GLYCOPYRROLATE 1 MG/5 ML
SYRINGE (ML) INTRAVENOUS PRN
Status: DISCONTINUED | OUTPATIENT
Start: 2020-01-17 | End: 2020-01-17 | Stop reason: SDUPTHER

## 2020-01-17 RX ORDER — BUPIVACAINE HYDROCHLORIDE 5 MG/ML
INJECTION, SOLUTION EPIDURAL; INTRACAUDAL PRN
Status: DISCONTINUED | OUTPATIENT
Start: 2020-01-17 | End: 2020-01-17 | Stop reason: ALTCHOICE

## 2020-01-17 RX ORDER — HYDROCODONE BITARTRATE AND ACETAMINOPHEN 5; 325 MG/1; MG/1
1 TABLET ORAL EVERY 6 HOURS PRN
Qty: 28 TABLET | Refills: 0 | Status: SHIPPED | OUTPATIENT
Start: 2020-01-17 | End: 2020-01-24

## 2020-01-17 RX ORDER — SODIUM CHLORIDE 0.9 % (FLUSH) 0.9 %
10 SYRINGE (ML) INJECTION PRN
Status: CANCELLED | OUTPATIENT
Start: 2020-01-17

## 2020-01-17 RX ORDER — FENTANYL CITRATE 50 UG/ML
50 INJECTION, SOLUTION INTRAMUSCULAR; INTRAVENOUS EVERY 5 MIN PRN
Status: DISCONTINUED | OUTPATIENT
Start: 2020-01-17 | End: 2020-01-17 | Stop reason: HOSPADM

## 2020-01-17 RX ORDER — ONDANSETRON 2 MG/ML
INJECTION INTRAMUSCULAR; INTRAVENOUS PRN
Status: DISCONTINUED | OUTPATIENT
Start: 2020-01-17 | End: 2020-01-17 | Stop reason: SDUPTHER

## 2020-01-17 RX ORDER — SODIUM CHLORIDE 9 MG/ML
INJECTION, SOLUTION INTRAVENOUS CONTINUOUS
Status: CANCELLED | OUTPATIENT
Start: 2020-01-17

## 2020-01-17 RX ORDER — NEOSTIGMINE METHYLSULFATE 5 MG/5 ML
SYRINGE (ML) INTRAVENOUS PRN
Status: DISCONTINUED | OUTPATIENT
Start: 2020-01-17 | End: 2020-01-17 | Stop reason: SDUPTHER

## 2020-01-17 RX ORDER — ONDANSETRON 2 MG/ML
4 INJECTION INTRAMUSCULAR; INTRAVENOUS
Status: COMPLETED | OUTPATIENT
Start: 2020-01-17 | End: 2020-01-17

## 2020-01-17 RX ORDER — HYDROMORPHONE HCL 110MG/55ML
PATIENT CONTROLLED ANALGESIA SYRINGE INTRAVENOUS PRN
Status: DISCONTINUED | OUTPATIENT
Start: 2020-01-17 | End: 2020-01-17 | Stop reason: SDUPTHER

## 2020-01-17 RX ORDER — SODIUM CHLORIDE 0.9 % (FLUSH) 0.9 %
10 SYRINGE (ML) INJECTION PRN
Status: DISCONTINUED | OUTPATIENT
Start: 2020-01-17 | End: 2020-01-17 | Stop reason: HOSPADM

## 2020-01-17 RX ORDER — HYDRALAZINE HYDROCHLORIDE 20 MG/ML
5 INJECTION INTRAMUSCULAR; INTRAVENOUS EVERY 10 MIN PRN
Status: DISCONTINUED | OUTPATIENT
Start: 2020-01-17 | End: 2020-01-17 | Stop reason: HOSPADM

## 2020-01-17 RX ORDER — ONDANSETRON 4 MG/1
4 TABLET, ORALLY DISINTEGRATING ORAL EVERY 8 HOURS PRN
Qty: 10 TABLET | Refills: 0 | Status: SHIPPED | OUTPATIENT
Start: 2020-01-17 | End: 2020-01-30

## 2020-01-17 RX ORDER — MORPHINE SULFATE 2 MG/ML
2 INJECTION, SOLUTION INTRAMUSCULAR; INTRAVENOUS EVERY 5 MIN PRN
Status: DISCONTINUED | OUTPATIENT
Start: 2020-01-17 | End: 2020-01-17 | Stop reason: HOSPADM

## 2020-01-17 RX ORDER — ONDANSETRON 2 MG/ML
4 INJECTION INTRAMUSCULAR; INTRAVENOUS EVERY 6 HOURS PRN
Status: CANCELLED | OUTPATIENT
Start: 2020-01-17

## 2020-01-17 RX ORDER — SODIUM CHLORIDE 0.9 % (FLUSH) 0.9 %
10 SYRINGE (ML) INJECTION EVERY 12 HOURS SCHEDULED
Status: DISCONTINUED | OUTPATIENT
Start: 2020-01-17 | End: 2020-01-17 | Stop reason: HOSPADM

## 2020-01-17 RX ORDER — ROCURONIUM BROMIDE 10 MG/ML
INJECTION, SOLUTION INTRAVENOUS PRN
Status: DISCONTINUED | OUTPATIENT
Start: 2020-01-17 | End: 2020-01-17 | Stop reason: SDUPTHER

## 2020-01-17 RX ORDER — HYDROCODONE BITARTRATE AND ACETAMINOPHEN 5; 325 MG/1; MG/1
1 TABLET ORAL EVERY 4 HOURS PRN
Status: DISCONTINUED | OUTPATIENT
Start: 2020-01-17 | End: 2020-01-17 | Stop reason: HOSPADM

## 2020-01-17 RX ORDER — DIPHENHYDRAMINE HYDROCHLORIDE 50 MG/ML
12.5 INJECTION INTRAMUSCULAR; INTRAVENOUS
Status: DISCONTINUED | OUTPATIENT
Start: 2020-01-17 | End: 2020-01-17 | Stop reason: HOSPADM

## 2020-01-17 RX ORDER — DEXAMETHASONE SODIUM PHOSPHATE 4 MG/ML
INJECTION, SOLUTION INTRA-ARTICULAR; INTRALESIONAL; INTRAMUSCULAR; INTRAVENOUS; SOFT TISSUE PRN
Status: DISCONTINUED | OUTPATIENT
Start: 2020-01-17 | End: 2020-01-17 | Stop reason: SDUPTHER

## 2020-01-17 RX ORDER — HYDROCODONE BITARTRATE AND ACETAMINOPHEN 5; 325 MG/1; MG/1
2 TABLET ORAL EVERY 4 HOURS PRN
Status: DISCONTINUED | OUTPATIENT
Start: 2020-01-17 | End: 2020-01-17 | Stop reason: HOSPADM

## 2020-01-17 RX ORDER — SODIUM CHLORIDE 9 MG/ML
INJECTION, SOLUTION INTRAVENOUS CONTINUOUS
Status: DISCONTINUED | OUTPATIENT
Start: 2020-01-17 | End: 2020-01-17 | Stop reason: HOSPADM

## 2020-01-17 RX ORDER — INDOCYANINE GREEN AND WATER 25 MG
1.25 KIT INJECTION ONCE
Status: COMPLETED | OUTPATIENT
Start: 2020-01-17 | End: 2020-01-17

## 2020-01-17 RX ORDER — FENTANYL CITRATE 50 UG/ML
INJECTION, SOLUTION INTRAMUSCULAR; INTRAVENOUS PRN
Status: DISCONTINUED | OUTPATIENT
Start: 2020-01-17 | End: 2020-01-17 | Stop reason: SDUPTHER

## 2020-01-17 RX ORDER — PROPOFOL 10 MG/ML
INJECTION, EMULSION INTRAVENOUS PRN
Status: DISCONTINUED | OUTPATIENT
Start: 2020-01-17 | End: 2020-01-17 | Stop reason: SDUPTHER

## 2020-01-17 RX ORDER — SODIUM CHLORIDE 0.9 % (FLUSH) 0.9 %
10 SYRINGE (ML) INJECTION EVERY 12 HOURS SCHEDULED
Status: CANCELLED | OUTPATIENT
Start: 2020-01-17

## 2020-01-17 RX ORDER — LIDOCAINE HCL/PF 100 MG/5ML
SYRINGE (ML) INJECTION PRN
Status: DISCONTINUED | OUTPATIENT
Start: 2020-01-17 | End: 2020-01-17 | Stop reason: SDUPTHER

## 2020-01-17 RX ORDER — MIDAZOLAM HYDROCHLORIDE 1 MG/ML
INJECTION INTRAMUSCULAR; INTRAVENOUS PRN
Status: DISCONTINUED | OUTPATIENT
Start: 2020-01-17 | End: 2020-01-17 | Stop reason: SDUPTHER

## 2020-01-17 RX ADMIN — HYDROMORPHONE HYDROCHLORIDE 1 MG: 2 INJECTION INTRAMUSCULAR; INTRAVENOUS; SUBCUTANEOUS at 08:57

## 2020-01-17 RX ADMIN — SODIUM CHLORIDE: 9 INJECTION, SOLUTION INTRAVENOUS at 07:23

## 2020-01-17 RX ADMIN — Medication 0.8 MG: at 08:42

## 2020-01-17 RX ADMIN — FENTANYL CITRATE 100 MCG: 50 INJECTION, SOLUTION INTRAMUSCULAR; INTRAVENOUS at 08:03

## 2020-01-17 RX ADMIN — MIDAZOLAM HYDROCHLORIDE 2 MG: 1 INJECTION, SOLUTION INTRAMUSCULAR; INTRAVENOUS at 08:03

## 2020-01-17 RX ADMIN — HYDROCODONE BITARTRATE AND ACETAMINOPHEN 1 TABLET: 5; 325 TABLET ORAL at 13:33

## 2020-01-17 RX ADMIN — HYDROMORPHONE HYDROCHLORIDE 1 MG: 2 INJECTION INTRAMUSCULAR; INTRAVENOUS; SUBCUTANEOUS at 08:03

## 2020-01-17 RX ADMIN — Medication 4 MG: at 08:42

## 2020-01-17 RX ADMIN — INDOCYANINE GREEN AND WATER 1.25 MG: KIT at 07:35

## 2020-01-17 RX ADMIN — DEXAMETHASONE SODIUM PHOSPHATE 10 MG: 4 INJECTION, SOLUTION INTRAMUSCULAR; INTRAVENOUS at 08:03

## 2020-01-17 RX ADMIN — CEFOXITIN 2 G: 2 INJECTION, POWDER, FOR SOLUTION INTRAVENOUS at 08:14

## 2020-01-17 RX ADMIN — SODIUM CHLORIDE: 9 INJECTION, SOLUTION INTRAVENOUS at 12:51

## 2020-01-17 RX ADMIN — Medication 100 MG: at 08:03

## 2020-01-17 RX ADMIN — ONDANSETRON 4 MG: 2 INJECTION INTRAMUSCULAR; INTRAVENOUS at 12:51

## 2020-01-17 RX ADMIN — ROCURONIUM BROMIDE 50 MG: 10 INJECTION INTRAVENOUS at 08:03

## 2020-01-17 RX ADMIN — PROPOFOL 200 MG: 10 INJECTION, EMULSION INTRAVENOUS at 08:03

## 2020-01-17 RX ADMIN — ONDANSETRON HYDROCHLORIDE 4 MG: 4 INJECTION, SOLUTION INTRAMUSCULAR; INTRAVENOUS at 08:03

## 2020-01-17 ASSESSMENT — PULMONARY FUNCTION TESTS
PIF_VALUE: 20
PIF_VALUE: 27
PIF_VALUE: 25
PIF_VALUE: 21
PIF_VALUE: 1
PIF_VALUE: 27
PIF_VALUE: 24
PIF_VALUE: 27
PIF_VALUE: 25
PIF_VALUE: 21
PIF_VALUE: 3
PIF_VALUE: 3
PIF_VALUE: 1
PIF_VALUE: 4
PIF_VALUE: 22
PIF_VALUE: 26
PIF_VALUE: 25
PIF_VALUE: 1
PIF_VALUE: 25
PIF_VALUE: 1
PIF_VALUE: 25
PIF_VALUE: 27
PIF_VALUE: 26
PIF_VALUE: 20
PIF_VALUE: 26
PIF_VALUE: 0
PIF_VALUE: 3
PIF_VALUE: 26
PIF_VALUE: 25
PIF_VALUE: 21
PIF_VALUE: 29
PIF_VALUE: 25
PIF_VALUE: 24
PIF_VALUE: 1
PIF_VALUE: 0
PIF_VALUE: 22
PIF_VALUE: 1
PIF_VALUE: 1
PIF_VALUE: 21
PIF_VALUE: 1
PIF_VALUE: 3
PIF_VALUE: 27
PIF_VALUE: 25
PIF_VALUE: 25
PIF_VALUE: 24
PIF_VALUE: 26
PIF_VALUE: 22
PIF_VALUE: 25
PIF_VALUE: 0
PIF_VALUE: 21
PIF_VALUE: 3
PIF_VALUE: 25
PIF_VALUE: 20
PIF_VALUE: 27
PIF_VALUE: 26
PIF_VALUE: 3
PIF_VALUE: 31
PIF_VALUE: 1
PIF_VALUE: 3
PIF_VALUE: 20

## 2020-01-17 ASSESSMENT — PAIN SCALES - GENERAL
PAINLEVEL_OUTOF10: 4
PAINLEVEL_OUTOF10: 6
PAINLEVEL_OUTOF10: 0

## 2020-01-17 ASSESSMENT — PAIN - FUNCTIONAL ASSESSMENT: PAIN_FUNCTIONAL_ASSESSMENT: 0-10

## 2020-01-17 ASSESSMENT — PAIN SCALES - WONG BAKER: WONGBAKER_NUMERICALRESPONSE: 0

## 2020-01-17 NOTE — H&P
meloxicam added which did start some symptoms of reflux. She has over the last year or so had intermittent episodes of epigastric and right upper quadrant pain with radiation around to the right flank. She denies any real triggers though spicy foods do seem to aggravate her symptoms. She has no known prior history of peptic ulcer disease, pancreatitis or jaundice. She had been taking Nexium over-the-counter. She has never had an EGD. Ultrasound of the gallbladder revealed numerous echogenic foci within the gallbladder with posterior shadowing. There was no gallbladder wall thickening or pericholecystic fluid. The common bile duct was normal size at 5 mm. Hepatic function tests within normal limits.     Past Medical History  Past Medical History        Past Medical History:   Diagnosis Date    Abdominal pain, right upper quadrant 2020    Arthritis       RA-sees Dr Asael Biggs    Calculus of gallbladder 2020    Depression      GERD (gastroesophageal reflux disease)      Hypertension      Migraine            Past Surgical History  Past Surgical History   History reviewed. No pertinent surgical history. Medications  Current Facility-Administered Medications          Current Outpatient Medications   Medication Sig Dispense Refill    meloxicam (MOBIC) 15 MG tablet Take 15 mg by mouth daily        pantoprazole (PROTONIX) 40 MG tablet Take 1 tablet by mouth every morning (before breakfast) 30 tablet 0    losartan-hydrochlorothiazide (HYZAAR) 100-25 MG per tablet TAKE ONE TABLET DAILY, BY MOUTH. 30 tablet 11    metoprolol succinate (TOPROL XL) 25 MG extended release tablet TAKE ONE TABLET DAILY, BY MOUTH. 30 tablet 11    methotrexate (RHEUMATREX) 2.5 MG chemo tablet Take 10 mg by mouth once a week        folic acid (FOLVITE) 1 MG tablet Take 1 mg by mouth daily        diclofenac (VOLTAREN) 75 MG EC tablet TAKE ONE TABLET, TWO TIMES A DAY, BY MOUTH.  (Patient taking differently: Indications: Patient is taking once daily ) 60 tablet 0    Hydroxychloroquine Sulfate (PLAQUENIL PO) Take 1 tablet by mouth daily         FLUoxetine (PROZAC) 20 MG capsule Take 20 mg by mouth daily 2 tabs daily          No current facility-administered medications for this visit.          Allergies   No Known Allergies    Family History  Family History         Family History   Problem Relation Age of Onset    Cancer Mother           breast and renal cancer    Depression Mother           SAD    High Blood Pressure Mother      Other Mother           gallbladder disease    Diabetes Father      Heart Disease Maternal Grandmother      High Blood Pressure Maternal Grandmother      Diabetes Maternal Grandfather      Heart Disease Maternal Grandfather      High Cholesterol Maternal Grandfather      No Known Problems Brother      Arthritis Paternal Grandmother           RA    Diabetes Paternal Grandfather      Hypertension Paternal Grandfather            SocialHistory  Social History               Socioeconomic History    Marital status:        Spouse name: Not on file    Number of children: 3    Years of education: Not on file    Highest education level: Not on file   Occupational History    Occupation:  and    Social Needs    Financial resource strain: Not hard at all   Vonage insecurity:       Worry: Never true       Inability: Never true    Transportation needs:       Medical: Not on file       Non-medical: Not on file   Tobacco Use    Smoking status: Never Smoker    Smokeless tobacco: Never Used   Substance and Sexual Activity    Alcohol use:  Yes       Alcohol/week: 0.0 standard drinks       Comment: social    Drug use: No    Sexual activity: Not Currently   Lifestyle    Physical activity:       Days per week: Not on file       Minutes per session: Not on file    Stress: Not on file   Relationships    Social connections:       Talks on phone: Not on file       Gets together: Not on file     Attends Nondenominational service: Not on file       Active member of club or organization: Not on file       Attends meetings of clubs or organizations: Not on file       Relationship status: Not on file    Intimate partner violence:       Fear of current or ex partner: Not on file       Emotionally abused: Not on file       Physically abused: Not on file       Forced sexual activity: Not on file   Other Topics Concern    Not on file   Social History Narrative    Not on file              Review of Systems  Review of Systems   Constitutional: Negative for chills, fatigue, fever and unexpected weight change. HENT: Negative for sore throat, trouble swallowing and voice change. Eyes: Negative for visual disturbance. Respiratory: Negative for cough, shortness of breath and wheezing. Cardiovascular: Negative for chest pain and palpitations. Gastrointestinal: Positive for abdominal distention, abdominal pain and nausea. Negative for blood in stool and vomiting. Endocrine: Negative for cold intolerance, heat intolerance and polydipsia. Genitourinary: Negative for dysuria, flank pain and hematuria. Musculoskeletal: Positive for arthralgias and myalgias. Negative for gait problem and joint swelling. Skin: Negative for color change and rash. Allergic/Immunologic: Negative for immunocompromised state. Neurological: Negative for dizziness, tremors, seizures and speech difficulty. Hematological: Does not bruise/bleed easily. Psychiatric/Behavioral: Negative for behavioral problems, confusion and suicidal ideas.         OBJECTIVE      /64 (Site: Right Upper Arm, Position: Sitting, Cuff Size: Medium Adult)   Pulse 84   Temp 98.4 °F (36.9 °C)   Resp 18   Ht 5' 5\" (1.651 m)   Wt 239 lb 8 oz (108.6 kg)   LMP 12/25/2019   SpO2 96%   BMI 39.85 kg/m²       Physical Exam  Vitals signs reviewed. Constitutional:       Appearance: She is well-developed. She is not ill-appearing or diaphoretic. HENT:      Head: Normocephalic and atraumatic. Nose: No rhinorrhea. Eyes:      General: No scleral icterus. Extraocular Movements: Extraocular movements intact. Pupils: Pupils are equal, round, and reactive to light. Neck:      Musculoskeletal: Normal range of motion and neck supple. Thyroid: No thyromegaly. Vascular: No JVD. Trachea: No tracheal deviation. Cardiovascular:      Rate and Rhythm: Normal rate and regular rhythm. Heart sounds: Normal heart sounds. No murmur. Pulmonary:      Effort: Pulmonary effort is normal. No respiratory distress. Breath sounds: Normal breath sounds. No wheezing. Abdominal:      General: Bowel sounds are normal. There is no distension. Palpations: Abdomen is soft. Tenderness: There is no tenderness. There is no guarding or rebound. Hernia: No hernia is present. Musculoskeletal: Normal range of motion. Lymphadenopathy:      Cervical: No cervical adenopathy. Skin:     General: Skin is warm and dry. Findings: No rash. Neurological:      Mental Status: She is alert and oriented to person, place, and time. Cranial Nerves: No cranial nerve deficit.                Lab Results   Component Value Date     WBC 6.0 11/18/2019     HGB 13.0 11/18/2019     HCT 39.5 11/18/2019      11/18/2019     ALT 27 01/13/2020     AST 19 01/13/2020      11/18/2019     K 4.0 11/18/2019     CL 99 11/18/2019     CREATININE 0.6 11/18/2019     BUN 10 11/18/2019     CO2 23 11/18/2019     TSH 1.770 10/06/2015          PROCEDURE: US GALLBLADDER RUQ       CLINICAL INFORMATION: RUQ abdominal pain .       COMPARISON: No prior study.       TECHNIQUE: Grayscale and color Doppler ultrasound       FINDINGS: Numerous echogenic foci within the gallbladder with posterior shadowing compatible with gallstones. There is no wall thickening or para cholecystic edema. Common bile duct is normal caliber.  Increased echogenicity of the Liver compatible with    hepatic steatosis. No intrahepatic ductal dilatation is seen.       Gallbladder - 7.2 x 3.6 x 2.2 cm   Gallbladder Wall - 0.2 cm   Common Duct - 0.5 cm   Castañeda's Sign: neg               Impression   Cholelithiasis without evidence of acute cholecystitis.             **This report has been created using voice recognition software.  It may contain minor errors which are inherent in voice recognition technology. **       Final report electronically signed by Dr. Fatou Silverman on 12/23/2019 11:35 AM

## 2020-01-17 NOTE — PROGRESS NOTES
5429: Patient arrived to PACU. Report received from MICHAEL Barrera and Marciano Slaughter [facility]. Patient placed on cardiac monitor. NP airway in place and pt on simple mask. Patient drowsy, opens eyes when name is called. 8566: Denies nausea. 8993: NP airway removed at this time. Pt waking up more. Able to fall back to sleep easily. 5644: Pt resting in cart with eyes closed. Denies needs. 0935: Patient meets pacu discharge criteria. Patient transported to [facility] in stable condition on 2 liters per nc. Report and chart given to Diego Valerio [facility]. Patient's family notified.

## 2020-01-17 NOTE — PROGRESS NOTES
The patient returned from recovery room. Awake. Able to open her eyes slightly when spoken to. The patient's family is in the room. Call light in reach.

## 2020-01-17 NOTE — ANESTHESIA POSTPROCEDURE EVALUATION
Department of Anesthesiology  Postprocedure Note    Patient: Christiana Coon  MRN: [id number]  YOB: 1970  Date of evaluation: 1/17/2020  Time:  3:28 PM     Procedure Summary     Date:  01/17/20 Room / Location:  [address] [place] [address]    Anesthesia Start:  0759 Anesthesia Stop:  0902    Procedure:  ROBOT LAP ROSHAN, UMBILICAL HERNIA REPAIR (N/A Abdomen) Diagnosis:  (CHOLELITHIASIS, UMBILICAL HERNIA)    Surgeon:  Reymundo Hatfield MD Responsible Provider:  Zenaida Mondragon MD    Anesthesia Type:  general ASA Status:  2          Anesthesia Type: general    Zen Phase I: Zen Score: 8    Zen Phase II:      Last vitals: Reviewed and per EMR flowsheets. Anesthesia Post Evaluation   [address]  POST-ANESTHESIA NOTE       Name:  Christiana Coon                                         Age:  51 y.o.   MRN:  [id number]      Last Vitals:  /80   Pulse 80   Temp 98.2 °F (36.8 °C) (Temporal)   Resp 18   Ht 5' 5\" (1.651 m)   Wt 240 lb 6.4 oz (109 kg)   LMP 12/25/2019   SpO2 96%   BMI 40.00 kg/m²   Patient Vitals for the past 4 hrs:   BP Pulse Resp SpO2   01/17/20 1333 115/80 80 18 --   01/17/20 1222 116/68 88 12 96 %       Level of Consciousness:  Awake    Respiratory:  Stable    Oxygen Saturation:  Stable    Cardiovascular:  Stable    Hydration:  Adequate    PONV:  Stable    Post-op Pain:  Adequate analgesia    Post-op Assessment:  No apparent anesthetic complications    Additional Follow-Up / Treatment / Comment:  None    Zenaida Mondragon MD  January 17, 2020   3:28 PM

## 2020-01-17 NOTE — ANESTHESIA PRE PROCEDURE
Department of Anesthesiology  Preprocedure Note       Name:  Tri Thornton   Age:  51 y.o.  :  1970                                          MRN:  [id number]         Date:  2020      Surgeon: Tommy Robles):  Janak Carver MD    Procedure: ROBOT LAP ROSHAN, POSS CHOLANGIOGRAM, POSS OPEN, UMBILICAL HERNIA REPAIR, POSS MESH (N/A Abdomen)    Medications prior to admission:   Prior to Admission medications    Medication Sig Start Date End Date Taking? Authorizing Provider   meloxicam (MOBIC) 15 MG tablet Take 15 mg by mouth daily   Yes Historical Provider, MD   pantoprazole (PROTONIX) 40 MG tablet Take 1 tablet by mouth every morning (before breakfast) 20  Yes Janak Carver MD   losartan-hydrochlorothiazide (HYZAAR) 100-25 MG per tablet TAKE ONE TABLET DAILY, BY MOUTH. 19  Yes GERALDINE Watts CNP   metoprolol succinate (TOPROL XL) 25 MG extended release tablet TAKE ONE TABLET DAILY, BY MOUTH. 19  Yes GERALDINE Watts CNP   methotrexate (RHEUMATREX) 2.5 MG chemo tablet Take 10 mg by mouth once a week   Yes Historical Provider, MD   folic acid (FOLVITE) 1 MG tablet Take 1 mg by mouth daily   Yes Historical Provider, MD   diclofenac (VOLTAREN) 75 MG EC tablet TAKE ONE TABLET, TWO TIMES A DAY, BY MOUTH.   Patient taking differently: Indications: Patient is taking once daily  18  Yes GERALDINE Watts CNP   Hydroxychloroquine Sulfate (PLAQUENIL PO) Take 1 tablet by mouth daily    Yes Historical Provider, MD   FLUoxetine (PROZAC) 20 MG capsule Take 20 mg by mouth daily 2 tabs daily   Yes Historical Provider, MD       Current medications:    Current Facility-Administered Medications   Medication Dose Route Frequency Provider Last Rate Last Dose    0.9 % sodium chloride infusion   Intravenous Continuous Janak Carver  mL/hr at 20 0328      sodium chloride flush 0.9 % injection 10 mL  10 mL Intravenous 2 times per day Janak Carver MD        sodium chloride flush 0.9 % injection

## 2020-01-18 NOTE — OP NOTE
[address] [place] [address]                                OPERATIVE REPORT    PATIENT NAME: Toy Garrido                       :        1970  MED REC NO:   [id number]                           ROOM:  ACCOUNT NO:   [de-identified]                           ADMIT DATE: 2020  PROVIDER:     Abel Huizar M.D.    DATE OF PROCEDURE:  2020    PREOPERATIVE DIAGNOSES:  1. Symptomatic cholelithiasis. 2.  Symptomatic enlarging incarcerated umbilical hernia. POSTOPERATIVE DIAGNOSES:  1. Symptomatic cholelithiasis. 2.  Symptomatic enlarging incarcerated umbilical hernia. OPERATIONS PERFORMED:  1.  Robotic-assisted laparoscopic cholecystectomy with ICG green  cholangiography. 2.  Open repair of incarcerated umbilical hernia. SURGEON:  Abel Huizar MD    ANESTHESIA:  General.    ESTIMATED BLOOD LOSS:  Less than 10 mL. SPECIMEN:  Gallbladder to Pathology. INDICATION:  See also history and physical examination. The patient  presented to the office with recurring symptoms of biliary colic. Imaging studies showing multiple gallstones, some evidence of fatty  infiltration of the liver. Preoperative hepatic function tests within  normal limits. Risks were discussed as outlined in the history and  physical examination. Discussed the use of robotic technology and the  patient wished to proceed. OPERATIVE PROCEDURE:  The patient was brought to the operating room,  placed supine on the operating table with pneumatic sequential  compression devices on the lower extremities. She had been given 1.25  mg of ICG green dye intravenously. After induction of general  anesthesia via endotracheal intubation, the patient's abdomen was  prepped and draped. She was given Mefoxin. Timeout was performed. Incision was made at the umbilicus in the area of incarcerated hernia.    The hernia sac was opened, contained incarcerated omentum which was  excised with electrocautery. Finger was swept inside. The defect was  larger, had to cinch it down and place the larger camera port with  reducer. CO2 pneumoperitoneum was introduced and inserted into the  abdominal cavity. The patient was placed in reverse Trendelenburg and turned toward the  left. Gallbladder was distended and somewhat intrahepatic. There were  no signs of acute inflammation. Two 8 mm Xi ports were placed in the  mid axillary line on the right and left side of the abdomen under direct  visualization. A 5 mm assistant port was placed in the right upper  quadrant laterally under the costal margin under direct visualization. Xi robot was then docked from the patient's right side and instruments  were inserted. I retired to the console. My assistant grasped the  gallbladder by the fundus and retracted it over the liver edge. Gentle  downward and lateral traction were applied to the infundibulum. Utilizing Firefly, common bile duct and cystic duct were identified  throughout the course of the procedure. Cystic duct was cleared from  surrounding tissue as was the artery behind in the critical view  obtained. Duct and artery were clipped proximally and distally with  Hem-o-linda clips and divided. The gallbladder was dissected off the  liver bed using cautery technique. I then scrubbed back into the patient's bedside. Firefly was utilized. There was no evidence of bleeding or bile leak. Gallbladder was then  placed in an EndoCatch type bag after instruments were removed and robot  undocked. Right upper quadrant was then reexamined. It was irrigated. Effluent returned clear. Gallbladder was delivered. The umbilical hernia was then repaired. The incarcerated omentum had  been reduced and the fascia was reapproximated with interrupted 0  Ethibond suture. All skin incisions were infiltrated with 0.5% Marcaine  with epinephrine.   They were closed, the skin

## 2020-01-20 ENCOUNTER — TELEPHONE (OUTPATIENT)
Dept: SURGERY | Age: 50
End: 2020-01-20

## 2020-01-30 ENCOUNTER — OFFICE VISIT (OUTPATIENT)
Dept: SURGERY | Age: 50
End: 2020-01-30

## 2020-01-30 VITALS
DIASTOLIC BLOOD PRESSURE: 80 MMHG | WEIGHT: 241 LBS | BODY MASS INDEX: 40.1 KG/M2 | SYSTOLIC BLOOD PRESSURE: 128 MMHG | TEMPERATURE: 97 F | HEART RATE: 89 BPM | OXYGEN SATURATION: 98 % | RESPIRATION RATE: 20 BRPM

## 2020-01-30 PROCEDURE — 99024 POSTOP FOLLOW-UP VISIT: CPT | Performed by: SURGERY

## 2020-05-13 RX ORDER — METOPROLOL SUCCINATE 25 MG/1
TABLET, EXTENDED RELEASE ORAL
Qty: 30 TABLET | Refills: 0 | Status: SHIPPED | OUTPATIENT
Start: 2020-05-13 | End: 2020-06-15

## 2020-05-18 ENCOUNTER — HOSPITAL ENCOUNTER (OUTPATIENT)
Age: 50
Discharge: HOME | End: 2020-05-18
Payer: COMMERCIAL

## 2020-05-18 LAB
ALBUMIN SERPL-MCNC: 4.2 G/DL (ref 3.5–5.1)
ALT SERPL-CCNC: 25 U/L (ref 11–66)
BASOPHILS # BLD: 0.3 %
BASOPHILS ABSOLUTE: 0 THOU/MM3 (ref 0–0.1)
CREAT SERPL-MCNC: 0.6 MG/DL (ref 0.4–1.2)
EOSINOPHIL # BLD: 3.2 %
EOSINOPHILS ABSOLUTE: 0.2 THOU/MM3 (ref 0–0.4)
ERYTHROCYTE [DISTWIDTH] IN BLOOD BY AUTOMATED COUNT: 13.9 % (ref 11.5–14.5)
ERYTHROCYTE [DISTWIDTH] IN BLOOD BY AUTOMATED COUNT: 46.7 FL (ref 35–45)
GFR SERPL CREATININE-BSD FRML MDRD: > 90 ML/MIN/1.73M2
HCT VFR BLD CALC: 43.4 % (ref 37–47)
HEMOGLOBIN: 13.6 GM/DL (ref 12–16)
IMMATURE GRANS (ABS): 0.02 THOU/MM3 (ref 0–0.07)
IMMATURE GRANULOCYTES: 0.3 %
LYMPHOCYTES # BLD: 37.2 %
LYMPHOCYTES ABSOLUTE: 2.2 THOU/MM3 (ref 1–4.8)
MCH RBC QN AUTO: 29.1 PG (ref 26–33)
MCHC RBC AUTO-ENTMCNC: 31.3 GM/DL (ref 32.2–35.5)
MCV RBC AUTO: 92.9 FL (ref 81–99)
MONOCYTES # BLD: 6 %
MONOCYTES ABSOLUTE: 0.4 THOU/MM3 (ref 0.4–1.3)
NUCLEATED RED BLOOD CELLS: 0 /100 WBC
PLATELET # BLD: 315 THOU/MM3 (ref 130–400)
PMV BLD AUTO: 10.1 FL (ref 9.4–12.4)
RBC # BLD: 4.67 MILL/MM3 (ref 4.2–5.4)
SEDIMENTATION RATE, ERYTHROCYTE: 17 MM/HR (ref 0–20)
SEG NEUTROPHILS: 53 %
SEGMENTED NEUTROPHILS ABSOLUTE COUNT: 3.1 THOU/MM3 (ref 1.8–7.7)
WBC # BLD: 5.9 THOU/MM3 (ref 4.8–10.8)

## 2020-05-18 PROCEDURE — 82565 ASSAY OF CREATININE: CPT

## 2020-05-18 PROCEDURE — 84460 ALANINE AMINO (ALT) (SGPT): CPT

## 2020-05-18 PROCEDURE — 36415 COLL VENOUS BLD VENIPUNCTURE: CPT

## 2020-05-18 PROCEDURE — 85651 RBC SED RATE NONAUTOMATED: CPT

## 2020-05-18 PROCEDURE — 82040 ASSAY OF SERUM ALBUMIN: CPT

## 2020-05-18 PROCEDURE — 85025 COMPLETE CBC W/AUTO DIFF WBC: CPT

## 2020-05-21 ENCOUNTER — HOSPITAL ENCOUNTER (OUTPATIENT)
Age: 50
Discharge: HOME | End: 2020-05-21
Payer: COMMERCIAL

## 2020-05-21 ENCOUNTER — OFFICE VISIT (OUTPATIENT)
Dept: FAMILY MEDICINE CLINIC | Age: 50
End: 2020-05-21
Payer: COMMERCIAL

## 2020-05-21 ENCOUNTER — HOSPITAL ENCOUNTER (OUTPATIENT)
Dept: GENERAL RADIOLOGY | Age: 50
Discharge: HOME | End: 2020-05-21
Payer: COMMERCIAL

## 2020-05-21 VITALS
TEMPERATURE: 97.8 F | HEIGHT: 65 IN | WEIGHT: 238 LBS | RESPIRATION RATE: 16 BRPM | DIASTOLIC BLOOD PRESSURE: 70 MMHG | BODY MASS INDEX: 39.65 KG/M2 | SYSTOLIC BLOOD PRESSURE: 104 MMHG | HEART RATE: 83 BPM

## 2020-05-21 PROCEDURE — 99213 OFFICE O/P EST LOW 20 MIN: CPT | Performed by: NURSE PRACTITIONER

## 2020-05-21 PROCEDURE — 73564 X-RAY EXAM KNEE 4 OR MORE: CPT

## 2020-05-21 RX ORDER — PREDNISONE 1 MG/1
TABLET ORAL
Qty: 45 TABLET | Refills: 0 | Status: SHIPPED | OUTPATIENT
Start: 2020-05-21 | End: 2020-05-31

## 2020-05-21 ASSESSMENT — ENCOUNTER SYMPTOMS
RESPIRATORY NEGATIVE: 1
EYES NEGATIVE: 1
GASTROINTESTINAL NEGATIVE: 1

## 2020-05-21 ASSESSMENT — PATIENT HEALTH QUESTIONNAIRE - PHQ9
SUM OF ALL RESPONSES TO PHQ QUESTIONS 1-9: 0
SUM OF ALL RESPONSES TO PHQ9 QUESTIONS 1 & 2: 0
2. FEELING DOWN, DEPRESSED OR HOPELESS: 0
1. LITTLE INTEREST OR PLEASURE IN DOING THINGS: 0
SUM OF ALL RESPONSES TO PHQ QUESTIONS 1-9: 0

## 2020-05-21 NOTE — PROGRESS NOTES
agreed withtreatment plan. Follow up as directed.      Electronically signed by GERALDINE Hernandez CNP on 5/21/2020 at 5:26 PM

## 2020-05-22 ENCOUNTER — TELEPHONE (OUTPATIENT)
Dept: FAMILY MEDICINE CLINIC | Age: 50
End: 2020-05-22

## 2020-09-01 ENCOUNTER — HOSPITAL ENCOUNTER (OUTPATIENT)
Dept: MAMMOGRAPHY | Age: 50
Discharge: HOME | End: 2020-09-01
Payer: COMMERCIAL

## 2020-09-01 LAB
ALBUMIN SERPL-MCNC: 4.1 G/DL (ref 3.5–5.1)
ALT SERPL-CCNC: 28 U/L (ref 11–66)
BASOPHILS # BLD: 0.4 %
BASOPHILS ABSOLUTE: 0 THOU/MM3 (ref 0–0.1)
CREAT SERPL-MCNC: 0.7 MG/DL (ref 0.4–1.2)
EOSINOPHIL # BLD: 2 %
EOSINOPHILS ABSOLUTE: 0.1 THOU/MM3 (ref 0–0.4)
ERYTHROCYTE [DISTWIDTH] IN BLOOD BY AUTOMATED COUNT: 13.8 % (ref 11.5–14.5)
ERYTHROCYTE [DISTWIDTH] IN BLOOD BY AUTOMATED COUNT: 46.5 FL (ref 35–45)
GFR SERPL CREATININE-BSD FRML MDRD: 89 ML/MIN/1.73M2
HCT VFR BLD CALC: 40.4 % (ref 37–47)
HEMOGLOBIN: 13 GM/DL (ref 12–16)
IMMATURE GRANS (ABS): 0.02 THOU/MM3 (ref 0–0.07)
IMMATURE GRANULOCYTES: 0.3 %
LYMPHOCYTES # BLD: 34.3 %
LYMPHOCYTES ABSOLUTE: 2.4 THOU/MM3 (ref 1–4.8)
MCH RBC QN AUTO: 30.2 PG (ref 26–33)
MCHC RBC AUTO-ENTMCNC: 32.2 GM/DL (ref 32.2–35.5)
MCV RBC AUTO: 94 FL (ref 81–99)
MONOCYTES # BLD: 4.4 %
MONOCYTES ABSOLUTE: 0.3 THOU/MM3 (ref 0.4–1.3)
NUCLEATED RED BLOOD CELLS: 0 /100 WBC
PLATELET # BLD: 306 THOU/MM3 (ref 130–400)
PMV BLD AUTO: 10.2 FL (ref 9.4–12.4)
RBC # BLD: 4.3 MILL/MM3 (ref 4.2–5.4)
SEDIMENTATION RATE, ERYTHROCYTE: 17 MM/HR (ref 0–20)
SEG NEUTROPHILS: 58.6 %
SEGMENTED NEUTROPHILS ABSOLUTE COUNT: 4 THOU/MM3 (ref 1.8–7.7)
WBC # BLD: 6.9 THOU/MM3 (ref 4.8–10.8)

## 2020-09-01 PROCEDURE — 85651 RBC SED RATE NONAUTOMATED: CPT

## 2020-09-01 PROCEDURE — 86480 TB TEST CELL IMMUN MEASURE: CPT

## 2020-09-01 PROCEDURE — 84460 ALANINE AMINO (ALT) (SGPT): CPT

## 2020-09-01 PROCEDURE — 82565 ASSAY OF CREATININE: CPT

## 2020-09-01 PROCEDURE — 82040 ASSAY OF SERUM ALBUMIN: CPT

## 2020-09-01 PROCEDURE — 85025 COMPLETE CBC W/AUTO DIFF WBC: CPT

## 2020-09-01 PROCEDURE — 77063 BREAST TOMOSYNTHESIS BI: CPT

## 2020-09-01 PROCEDURE — 36415 COLL VENOUS BLD VENIPUNCTURE: CPT

## 2020-09-05 LAB
QUANTI TB GOLD PLUS: NEGATIVE
QUANTI TB1 MINUS NIL: 0 IU/ML (ref 0–0.34)
QUANTI TB2 MINUS NIL: 0 IU/ML (ref 0–0.34)
QUANTIFERON MITOGEN MINUS NIL: 9.73 IU/ML
QUANTIFERON NIL: 0.01 IU/ML

## 2020-09-09 ENCOUNTER — HOSPITAL ENCOUNTER (OUTPATIENT)
Dept: WOMENS IMAGING | Age: 50
Discharge: HOME | End: 2020-09-09
Payer: COMMERCIAL

## 2020-09-09 PROCEDURE — 76642 ULTRASOUND BREAST LIMITED: CPT

## 2020-09-09 PROCEDURE — G0279 TOMOSYNTHESIS, MAMMO: HCPCS

## 2020-09-21 ENCOUNTER — HOSPITAL ENCOUNTER (OUTPATIENT)
Dept: WOMENS IMAGING | Age: 50
End: 2020-09-21
Payer: COMMERCIAL

## 2020-09-21 ENCOUNTER — HOSPITAL ENCOUNTER (OUTPATIENT)
Dept: WOMENS IMAGING | Age: 50
Discharge: HOME | End: 2020-09-21
Payer: COMMERCIAL

## 2020-09-21 PROCEDURE — 76942 ECHO GUIDE FOR BIOPSY: CPT

## 2020-09-21 PROCEDURE — 77065 DX MAMMO INCL CAD UNI: CPT

## 2020-10-01 ENCOUNTER — TELEPHONE (OUTPATIENT)
Dept: CASE MANAGEMENT | Age: 50
End: 2020-10-01

## 2020-10-01 NOTE — TELEPHONE ENCOUNTER
Name: Debby Sims  : 1970  MRN: [id number]    Oncology Navigation Follow-Up Note    Contact Type:  Telephone    Notes: Patient called requesting genetic evaluation in [place] with Whitney Nickerson, [address]. States \"Mom had breast cancer 3 times, now metastatic and had previous renal cancer. Did not loly wanting called during mammogram 2020 but they gave your card to call. \" Screen was at [facility]. Found risk form and reviewed family history with her. Will call [facility] office for referral and fax form that needs completed. Office to fax to genetic's office and Whitney Nickerson will get patient scheduled Oct/Nov time frame. Patient verbalizes understanding of all information. Encouraged her to call with questions anytime. Family History as verified: Mother breast cancer 3 times, ages 64, 61, 71. At 71 found to have metastatic breast cancer. Also had renal cancer, age 58. Gets treatment for Dr. Claudia Tracey currently. Maternal aunt breast cancer, age 54.     Electronically signed by Kash Alfaro RN on 10/1/2020 at 10:22 AM

## 2020-10-28 RX ORDER — METOPROLOL SUCCINATE 25 MG/1
TABLET, EXTENDED RELEASE ORAL
Qty: 30 TABLET | Refills: 0 | Status: SHIPPED | OUTPATIENT
Start: 2020-10-28 | End: 2020-12-07

## 2020-12-07 RX ORDER — METOPROLOL SUCCINATE 25 MG/1
TABLET, EXTENDED RELEASE ORAL
Qty: 30 TABLET | Refills: 0 | Status: SHIPPED | OUTPATIENT
Start: 2020-12-07 | End: 2021-01-12

## 2020-12-07 RX ORDER — HYDROCHLOROTHIAZIDE 25 MG/1
TABLET ORAL
Qty: 30 TABLET | Refills: 0 | Status: SHIPPED | OUTPATIENT
Start: 2020-12-07 | End: 2021-01-12

## 2020-12-07 RX ORDER — LOSARTAN POTASSIUM 100 MG/1
TABLET ORAL
Qty: 30 TABLET | Refills: 0 | Status: SHIPPED | OUTPATIENT
Start: 2020-12-07 | End: 2021-01-12

## 2021-01-06 ENCOUNTER — HOSPITAL ENCOUNTER (OUTPATIENT)
Age: 51
Discharge: HOME | End: 2021-01-06
Payer: COMMERCIAL

## 2021-01-06 LAB
BASOPHILS # BLD: 0.4 %
BASOPHILS ABSOLUTE: 0 THOU/MM3 (ref 0–0.1)
EOSINOPHIL # BLD: 1.5 %
EOSINOPHILS ABSOLUTE: 0.1 THOU/MM3 (ref 0–0.4)
ERYTHROCYTE [DISTWIDTH] IN BLOOD BY AUTOMATED COUNT: 13.6 % (ref 11.5–14.5)
ERYTHROCYTE [DISTWIDTH] IN BLOOD BY AUTOMATED COUNT: 45.5 FL (ref 35–45)
HCT VFR BLD CALC: 42.6 % (ref 37–47)
HEMOGLOBIN: 14 GM/DL (ref 12–16)
IMMATURE GRANS (ABS): 0.02 THOU/MM3 (ref 0–0.07)
IMMATURE GRANULOCYTES: 0.3 %
LYMPHOCYTES # BLD: 33.1 %
LYMPHOCYTES ABSOLUTE: 2.6 THOU/MM3 (ref 1–4.8)
MCH RBC QN AUTO: 30.1 PG (ref 26–33)
MCHC RBC AUTO-ENTMCNC: 32.9 GM/DL (ref 32.2–35.5)
MCV RBC AUTO: 91.6 FL (ref 81–99)
MONOCYTES # BLD: 5.3 %
MONOCYTES ABSOLUTE: 0.4 THOU/MM3 (ref 0.4–1.3)
NUCLEATED RED BLOOD CELLS: 0 /100 WBC
PLATELET # BLD: 339 THOU/MM3 (ref 130–400)
PMV BLD AUTO: 10.1 FL (ref 9.4–12.4)
RBC # BLD: 4.65 MILL/MM3 (ref 4.2–5.4)
SEG NEUTROPHILS: 59.4 %
SEGMENTED NEUTROPHILS ABSOLUTE COUNT: 4.7 THOU/MM3 (ref 1.8–7.7)
WBC # BLD: 7.9 THOU/MM3 (ref 4.8–10.8)

## 2021-01-06 PROCEDURE — 85651 RBC SED RATE NONAUTOMATED: CPT

## 2021-01-06 PROCEDURE — 85025 COMPLETE CBC W/AUTO DIFF WBC: CPT

## 2021-01-06 PROCEDURE — 84460 ALANINE AMINO (ALT) (SGPT): CPT

## 2021-01-06 PROCEDURE — 36415 COLL VENOUS BLD VENIPUNCTURE: CPT

## 2021-01-06 PROCEDURE — 82040 ASSAY OF SERUM ALBUMIN: CPT

## 2021-01-06 PROCEDURE — 82565 ASSAY OF CREATININE: CPT

## 2021-01-07 LAB
ALBUMIN SERPL-MCNC: 4.3 G/DL (ref 3.5–5.1)
ALT SERPL-CCNC: 25 U/L (ref 11–66)
CREAT SERPL-MCNC: 0.7 MG/DL (ref 0.4–1.2)
GFR SERPL CREATININE-BSD FRML MDRD: 89 ML/MIN/1.73M2
SEDIMENTATION RATE, ERYTHROCYTE: 18 MM/HR (ref 0–20)

## 2021-01-12 DIAGNOSIS — I10 ESSENTIAL HYPERTENSION: ICD-10-CM

## 2021-01-12 RX ORDER — HYDROCHLOROTHIAZIDE 25 MG/1
TABLET ORAL
Qty: 30 TABLET | Refills: 0 | Status: SHIPPED | OUTPATIENT
Start: 2021-01-12 | End: 2021-03-08

## 2021-01-12 RX ORDER — LOSARTAN POTASSIUM 100 MG/1
TABLET ORAL
Qty: 30 TABLET | Refills: 0 | Status: SHIPPED | OUTPATIENT
Start: 2021-01-12 | End: 2021-03-08

## 2021-01-12 RX ORDER — METOPROLOL SUCCINATE 25 MG/1
TABLET, EXTENDED RELEASE ORAL
Qty: 30 TABLET | Refills: 0 | Status: SHIPPED | OUTPATIENT
Start: 2021-01-12 | End: 2021-03-08

## 2021-01-26 ENCOUNTER — INITIAL CONSULT (OUTPATIENT)
Dept: ONCOLOGY | Age: 51
End: 2021-01-26
Payer: COMMERCIAL

## 2021-01-26 DIAGNOSIS — Z80.3 FAMILY HISTORY OF BREAST CANCER: Primary | ICD-10-CM

## 2021-01-26 PROCEDURE — 96040 PR GENETIC COUNSELING, EACH 30 MIN: CPT | Performed by: GENETIC COUNSELOR, MS

## 2021-01-26 NOTE — PROGRESS NOTES
3 [facility]   Hereditary Cancer Risk Assessment     Name: Christopher De Santiago   YOB: 1970   Date of Consultation: 1/26/21     Ms. Bi Chan was seen at the [facility] for genetic counseling on 1/26/21. Ms. Bi Chan was referred by GERALDINE Duke CNP due to her family history of cancer. PERSONAL HISTORY   Ms. Bi Chan is a 53 y.o.  female with no personal history of cancer. Ms. Bi Chan reports menarche at age 14, first child at age 30, and is premenopausal. Ms. Bi Chan has never had a hysterectomy and both ovaries are intact. Ms. Bi Chan reports annual mammograms. She has never had a breast MRI or required a breast biopsy. FAMILY HISTORY  Ms. Bi Chan has 2 son(s) and 1 daughter(s). She has 0 full sister(s) and 1 full brother(s). Ms. Anup De Santiago's mother passed away at age 79 and had an extensive history of cancer including left breast cancer at age 64, right breast cancer age 61, and renal cell carcinoma at age 58. Her maternal aunt had breast cancer at age 48 and passed away at age 43. Ms. Rachele Castro father is living at age 79. There are no other known cancers in her paternal family. Ms. Bi Chan reports [facility] ancestry and denies any known Ashkenazi Adventism heritage. RISK ASSESSMENT   We discussed that approximately 5-10% of cancers are due to a hereditary gene mutation which causes an increased risk for certain cancers. Hereditary cancers are typically diagnosed at younger ages (under age 46y) and occur in multiple generations of a family. Multiple individuals with the same type of cancer (example: breast or colorectal) or uncommon cancers (example: ovarian, pancreatic, male breast cancer) are also features of hereditary cancers. We discussed that Ms. Kiran De Santiago's maternal family history is concerning for a hereditary predisposition given that she has several maternal relatives with breast cancer. In summary, Ms. [address] [place] [address] Drive (NCCN) guidelines for genetic testing of the BRCA1/2 genes based on having two maternal relatives (mother and aunt) with breast cancer, with one occurring under age 48. The NCCN guidelines also recognize that an individual's personal and/or family history may be explained by more than one inherited cancer syndrome. Thus, a multi-gene panel may be more efficient, more cost effecting, and increases the yield of detecting a hereditary mutation which would impact medical management. Given her personal and/or family history , we recommend testing for the following genes at minimum: GERALD, CHEK2, NBN, and PALB2. DISCUSSION  We discussed that the BRCA1/2 genes are the most common genes associated with hereditary breast and ovarian cancer. We also discussed that genetic testing is available for multiple other genes related to hereditary cancer. Some of these genes are known to carry a significant increased risk for several cancers including colon, breast, uterine, ovarian, stomach, and pancreatic cancer, while some of these genes are believed to have a moderate increased risk for breast and other cancers. We discussed the possibility of finding a mutation in genes with limited information to guide medical management, as well we as the possibility of identifying variants of uncertain significance (VUS). We discussed the risks, benefits, and limitations of genetic testing. Possible test results were discussed as well as potential screening and prevention strategies. Specifically, we discussed increased breast cancer surveillance by mammogram and breast MRI as well as the option of prophylactic mastectomy. We discussed the recommendation for prophylactic oophorectomy for results which suggest an increased risk for ovarian cancer. Lastly, we discussed that the results of Ms. Kiran De Santiago's genetic testing may be beneficial in defining her risk for cancer as well as for her family members. SUMMARY & PLAN  1) Ms. Kiran De Santiago meets the NCCN criteria for genetic testing based on her maternal family history of early onset breast cancer. 2) Genetic testing via a multi-gene panel was recommended and offered to Ms. Kiran De Santiago. 3) Ms. Kiran De Santiago elected to proceed with the CancerNext Expanded + RNA Insight gene panel. 4) Ms. Edwina Osuna is aware that she will receive a notification from [facility] if the out of pocket cost for testing exceeds $100 (based on individual insurance plan) and the option to proceed with the self pay price of $249.     5) Informed consent was obtained and a blood sample was sent to [facility]. We will call Ms. Kiran De Santiago with results as soon as they are available. A follow up appointment may be recommended. A summary letter with results and final medical management recommendations will be sent once available. A total of 40 minutes were spent face to face with Ms. Kiran De Santiago and 50% of the time was spent educating and counseling. The [facility] National Program would be glad to offer our assistance should you have any questions or concerns about this information. Please feel free to contact us at [phone]. Jesus Cantu, MS, [facility]   Licensed Genetic Counselor

## 2021-03-11 ENCOUNTER — TELEPHONE (OUTPATIENT)
Dept: ONCOLOGY | Age: 51
End: 2021-03-11

## 2021-03-11 NOTE — TELEPHONE ENCOUNTER
3 [facility]   Hereditary Cancer Risk Assessment     Name: Sofía Tesfaye  YOB: 1970  Date of Results Disclosure: 3/11/21    HISTORY   Ms. Parish Cristina was seen for genetic counseling at the request of GERALDINE Singh CNP due to her family history of breast cancer. At that time, Ms. Kiran De Santiago chose to pursue genetic testing via the CancerNext Expanded + RNA insight gene panel. These results were discussed with Ms. Kiran De Santiago via telephone. A summary of Ms. Kiran De Santiago's results and recommendations are below. 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 (p.G396D)   This panel included the analysis of 77 genes associated with hereditary cancer including: AIP, ALK, APC, GERALD, BAP1, BARD1, BLM, BMPR1A, BRCA1, BRCA2, BRIP1, CDC73, CDH1, CDK4, CDKN1B, CDKN2A, CHEK2, CTNNA1, DICER1, EGFR, EGLN1, EPCAM, FANCC, FH, FLCN, GALNT12, GREM1, HOXB13, KIF1B, KIT1, LZTR1, MAX, MEN1, MET, MITF, MLH1, MSH2, MSH3, MSH6, MUTYH, NBN, NF1, NF2, NTHL1, PALB2, PDGFRA, PHOX2B, PMS2, POLD1, POLE, POT1, DMTKE3G, PTCH1, PTEN, RAD51C, RAD51D, RB1, RECQL, RET, SDHA, SDHAF2, SDHB, SDHC, ,SDHD, SMAD4, SMARCA4, SMARCB1, SMARCE1, STK11, SUFU, EEWY167, TP53, TSC1, TSC2, VHL, and XRCC2. In addition, no clinically relevant aberrant RNA transcripts were detected in select genes. Please refer to genetic test report for technical details. Additional findings: VARIANT OF UNCERTAIN SIGNIFICANCE - MSH3 (p.L911W)  A variant of uncertain significance (VUS) occurs when the laboratory does not have enough data to determine whether the genetic variant is benign (not associated with cancer) or pathogenic (associated with cancer). Because the significance of the MSH3 gene VUS is unknown, medical management must be based on Ms. Kiran De Santiago's personal history and family history of cancer.      Ms. 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 The MUTYH gene is classically associated with an autosomal recessive condition called MUTYH - associated polyposis or \"MAP\". Individuals with two MUTYH gene mutations have MAP. Individuals with just one MUTYH gene mutation do not have MAP but are carriers for the condition. Ms. Jia De Santiago's genetic test results reveal that she has one MUTYH gene mutation and therefore, 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. If Ms. Kiran De Santiago's partner happened to carry a MUTYH gene mutation, there is a 25% chance that both parents could pass on the MUTYH mutation to each of their children and they would have MAP. Individuals with MAP have an increased chance of developing colorectal polyps and colorectal cancer throughout their lifetime. There is some evidence to suggest that individuals with one MUTYH gene mutation have an increased risk for colorectal cancer, particularly if they have a first degree relative with colon cancer. There were no other clinically significant mutations identified in Ms. Kiran De Santiago's genetic test results which would explain her family history of breast cancer. However, it is possible that her family history of cancer is due to a hereditary mutation which she did not inherit. It is also possible that her family history of cancer may be due to a gene for which testing was not performed or which has yet to be discovered. RECOMMENDATIONS  COLON CANCER  Individuals with a single MUTYH gene mutation may have a slight increased risk for colorectal cancer. This increase in risk is most consistent in individuals who have a first degree relative with colorectal cancer. Therefore, individuals who have a first degree relative with colorectal cancer may consider the [employer] (NCCN) guidelines for MUTYH carriers.  This includes colonoscopy screening every 5 years, beginning at age 36 or 8 years available regarding the classification of this genetic variant. 3) Given that Ms. Nicole Ch does not have a first degree relative with colorectal cancer, we recommend that she continue colon cancer screening as directed by her physicians. The NCCN recommends individuals consider colonoscopy every 10 years beginning at age 48.     3) Ms. Kiran De Santiago's lifetime risk for breast cancer is still increased due to her family history. Her estimated lifetime risk for breast cancer is 29.5% according to the [employer] risk model. The NCCN recommends that Ms. iKran De Santiago consider annual breast MRI screening staggered 6 months apart from annual mammograms. She will discuss this with her primary care provider. 5) Otherwise, there are no changes in medical management for Ms. Kiran De Santiago based on her genetic test results. She should continue all other cancer surveillance as directed by her physicians. 6) We encourage Ms. Kiran De Santiago to contact us every 1-2 years to determine if there are any new genetic testing or research options available. 7) We encourage Ms. Kiran De Santiago to contact us with updates to her personal and/or familys cancer history as this information may alter our assessment and/or recommendations. The [facility] would be glad to offer our assistance should you have any questions or concerns about this information. Please feel free to contact us at [phone]. Carlene Mcbride.  Chemo Casas MS, [facility]   Licensed Genetic Counselor    CC:   Ms. Rebekah Bains

## 2021-06-04 ENCOUNTER — NURSE ONLY (OUTPATIENT)
Dept: LAB | Age: 51
End: 2021-06-04

## 2021-06-08 ENCOUNTER — HOSPITAL ENCOUNTER (OUTPATIENT)
Age: 51
Discharge: HOME | End: 2021-06-08
Payer: COMMERCIAL

## 2021-06-08 LAB
ALBUMIN SERPL-MCNC: 4.1 G/DL (ref 3.5–5.1)
ALT SERPL-CCNC: 12 U/L (ref 11–66)
BASOPHILS # BLD: 0.4 %
BASOPHILS ABSOLUTE: 0 THOU/MM3 (ref 0–0.1)
CREAT SERPL-MCNC: 0.7 MG/DL (ref 0.4–1.2)
EOSINOPHIL # BLD: 1.1 %
EOSINOPHILS ABSOLUTE: 0.1 THOU/MM3 (ref 0–0.4)
ERYTHROCYTE [DISTWIDTH] IN BLOOD BY AUTOMATED COUNT: 13.2 % (ref 11.5–14.5)
ERYTHROCYTE [DISTWIDTH] IN BLOOD BY AUTOMATED COUNT: 44.8 FL (ref 35–45)
GFR SERPL CREATININE-BSD FRML MDRD: 89 ML/MIN/1.73M2
HCT VFR BLD CALC: 40.8 % (ref 37–47)
HEMOGLOBIN: 13.2 GM/DL (ref 12–16)
IMMATURE GRANS (ABS): 0.02 THOU/MM3 (ref 0–0.07)
IMMATURE GRANULOCYTES: 0.3 %
LYMPHOCYTES # BLD: 31.6 %
LYMPHOCYTES ABSOLUTE: 2.3 THOU/MM3 (ref 1–4.8)
MCH RBC QN AUTO: 30 PG (ref 26–33)
MCHC RBC AUTO-ENTMCNC: 32.4 GM/DL (ref 32.2–35.5)
MCV RBC AUTO: 92.7 FL (ref 81–99)
MONOCYTES # BLD: 7.5 %
MONOCYTES ABSOLUTE: 0.6 THOU/MM3 (ref 0.4–1.3)
NUCLEATED RED BLOOD CELLS: 0 /100 WBC
PLATELET # BLD: 344 THOU/MM3 (ref 130–400)
PMV BLD AUTO: 10.1 FL (ref 9.4–12.4)
RBC # BLD: 4.4 MILL/MM3 (ref 4.2–5.4)
SEDIMENTATION RATE, ERYTHROCYTE: 12 MM/HR (ref 0–20)
SEG NEUTROPHILS: 59.1 %
SEGMENTED NEUTROPHILS ABSOLUTE COUNT: 4.4 THOU/MM3 (ref 1.8–7.7)
WBC # BLD: 7.4 THOU/MM3 (ref 4.8–10.8)

## 2021-06-08 PROCEDURE — 84460 ALANINE AMINO (ALT) (SGPT): CPT

## 2021-06-08 PROCEDURE — 85651 RBC SED RATE NONAUTOMATED: CPT

## 2021-06-08 PROCEDURE — 84439 ASSAY OF FREE THYROXINE: CPT

## 2021-06-08 PROCEDURE — 82565 ASSAY OF CREATININE: CPT

## 2021-06-08 PROCEDURE — 84443 ASSAY THYROID STIM HORMONE: CPT

## 2021-06-08 PROCEDURE — 36415 COLL VENOUS BLD VENIPUNCTURE: CPT

## 2021-06-08 PROCEDURE — 82040 ASSAY OF SERUM ALBUMIN: CPT

## 2021-06-08 PROCEDURE — 85025 COMPLETE CBC W/AUTO DIFF WBC: CPT

## 2021-06-09 LAB
T4 FREE: 1.27 NG/DL (ref 0.93–1.76)
TSH SERPL DL<=0.05 MIU/L-ACNC: 0.76 UIU/ML (ref 0.4–4.2)

## 2021-06-11 ENCOUNTER — HOSPITAL ENCOUNTER (OUTPATIENT)
Dept: WOMENS IMAGING | Age: 51
Discharge: HOME | End: 2021-06-11
Payer: COMMERCIAL

## 2021-06-11 DIAGNOSIS — R92.2 BREAST DENSITY: ICD-10-CM

## 2021-06-11 DIAGNOSIS — Z09 FOLLOW-UP EXAM: ICD-10-CM

## 2021-06-11 PROCEDURE — G0279 TOMOSYNTHESIS, MAMMO: HCPCS

## 2021-06-14 DIAGNOSIS — I10 ESSENTIAL HYPERTENSION: ICD-10-CM

## 2021-06-14 RX ORDER — METOPROLOL SUCCINATE 25 MG/1
TABLET, EXTENDED RELEASE ORAL
Qty: 30 TABLET | Refills: 0 | Status: SHIPPED | OUTPATIENT
Start: 2021-06-14 | End: 2021-07-14

## 2021-06-14 RX ORDER — HYDROCHLOROTHIAZIDE 25 MG/1
TABLET ORAL
Qty: 30 TABLET | Refills: 0 | Status: SHIPPED | OUTPATIENT
Start: 2021-06-14 | End: 2021-07-14

## 2021-06-17 LAB — CYTOLOGY THIN PREP PAP: NORMAL

## 2021-06-26 ENCOUNTER — NURSE ONLY (OUTPATIENT)
Dept: LAB | Age: 51
End: 2021-06-26

## 2021-07-01 ENCOUNTER — OFFICE VISIT (OUTPATIENT)
Dept: FAMILY MEDICINE CLINIC | Age: 51
End: 2021-07-01
Payer: COMMERCIAL

## 2021-07-01 VITALS
RESPIRATION RATE: 14 BRPM | WEIGHT: 209 LBS | SYSTOLIC BLOOD PRESSURE: 122 MMHG | HEART RATE: 80 BPM | BODY MASS INDEX: 34.78 KG/M2 | TEMPERATURE: 97.2 F | DIASTOLIC BLOOD PRESSURE: 78 MMHG | OXYGEN SATURATION: 98 %

## 2021-07-01 DIAGNOSIS — D25.9 UTERINE LEIOMYOMA, UNSPECIFIED LOCATION: Primary | ICD-10-CM

## 2021-07-01 PROCEDURE — 99213 OFFICE O/P EST LOW 20 MIN: CPT | Performed by: NURSE PRACTITIONER

## 2021-07-01 SDOH — ECONOMIC STABILITY: FOOD INSECURITY: WITHIN THE PAST 12 MONTHS, YOU WORRIED THAT YOUR FOOD WOULD RUN OUT BEFORE YOU GOT MONEY TO BUY MORE.: NEVER TRUE

## 2021-07-01 SDOH — ECONOMIC STABILITY: FOOD INSECURITY: WITHIN THE PAST 12 MONTHS, THE FOOD YOU BOUGHT JUST DIDN'T LAST AND YOU DIDN'T HAVE MONEY TO GET MORE.: NEVER TRUE

## 2021-07-01 ASSESSMENT — ENCOUNTER SYMPTOMS
GASTROINTESTINAL NEGATIVE: 1
EYES NEGATIVE: 1
RESPIRATORY NEGATIVE: 1

## 2021-07-01 ASSESSMENT — PATIENT HEALTH QUESTIONNAIRE - PHQ9
2. FEELING DOWN, DEPRESSED OR HOPELESS: 0
1. LITTLE INTEREST OR PLEASURE IN DOING THINGS: 0
SUM OF ALL RESPONSES TO PHQ9 QUESTIONS 1 & 2: 0
SUM OF ALL RESPONSES TO PHQ QUESTIONS 1-9: 0

## 2021-07-01 ASSESSMENT — SOCIAL DETERMINANTS OF HEALTH (SDOH): HOW HARD IS IT FOR YOU TO PAY FOR THE VERY BASICS LIKE FOOD, HOUSING, MEDICAL CARE, AND HEATING?: NOT HARD AT ALL

## 2021-07-01 NOTE — PROGRESS NOTES
Rj Harvey is a 49 y.o. female whopresents today for :  Chief Complaint   Patient presents with    Other     discuss GYN recommendations on IUD       HPI:     HPI     pt here to discuss recent test results. Had US that showed ovarian growths. Saw GYN that recommend IUD. She ask if that is good advice.     Patient Active Problem List   Diagnosis    GERD (gastroesophageal reflux disease)    Calculus of gallbladder with chronic cholecystitis without obstruction    Incarcerated umbilical hernia        Past Medical History:   Diagnosis Date    Abdominal pain, right upper quadrant 2020    Arthritis     RA-sees Dr Kasia Ladd    Calculus of gallbladder 2020    Depression     Genetic testing 01/26/2021    Amb panel-one MUTYH gene mutation and therefore, 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    GERD (gastroesophageal reflux disease)     Hypertension     Migraine       Past Surgical History:   Procedure Laterality Date    CHOLECYSTECTOMY, LAPAROSCOPIC N/A 1/17/2020    ROBOT LAP ROSHAN, UMBILICAL HERNIA REPAIR performed by Dalia Cummings MD at [place] [address] LEFT  9/21/2020    [facility] US BREAST CYST ASPIRATION LEFT 9/21/2020 [address] BREAST FINE NEEDLE ASPIRATION Left 09/21/2020     Family History   Problem Relation Age of Onset    Cancer Mother 58        renal cancer    Depression Mother         SAD    High Blood Pressure Mother     Other Mother         gallbladder disease    Breast Cancer Mother 64        2nd time age 61, 2rd time age 71 (metastatic at this time)    Diabetes Father     Lung Cancer Father     Heart Disease Maternal Grandmother     High Blood Pressure Maternal Grandmother     Diabetes Maternal Grandfather     Heart Disease Maternal Grandfather     High Cholesterol Maternal Grandfather     No Known Problems Brother     Arthritis Paternal Grandmother         RA    Diabetes Paternal Grandfather     Hypertension Paternal Grandfather     Breast Cancer Negative. Objective:     Vitals:    07/01/21 1351   BP: 122/78   Site: Left Upper Arm   Position: Sitting   Cuff Size: Large Adult   Pulse: 80   Resp: 14   Temp: 97.2 °F (36.2 °C)   TempSrc: Temporal   SpO2: 98%   Weight: 209 lb (94.8 kg)       Physical Exam  Constitutional:       Appearance: She is well-developed. HENT:      Head: Normocephalic. Right Ear: Tympanic membrane and external ear normal.      Left Ear: Tympanic membrane and external ear normal.      Nose: Nose normal.   Cardiovascular:      Rate and Rhythm: Normal rate and regular rhythm. Heart sounds: Normal heart sounds. No murmur heard. No friction rub. No gallop. Pulmonary:      Effort: Pulmonary effort is normal.      Breath sounds: Normal breath sounds. No wheezing or rales. Abdominal:      General: Bowel sounds are normal.      Palpations: Abdomen is soft. Tenderness: There is no abdominal tenderness. There is no guarding. Musculoskeletal:         General: Normal range of motion. Cervical back: Normal range of motion and neck supple. Lymphadenopathy:      Cervical: No cervical adenopathy. Skin:     General: Skin is warm. Neurological:      Mental Status: She is alert and oriented to person, place, and time. Deep Tendon Reflexes: Reflexes are normal and symmetric. Assessment:      Diagnosis Orders   1. Uterine leiomyoma, unspecified location         Plan:      No follow-ups on file. No orders of the defined types were placed in this encounter. No orders of the defined types were placed in this encounter. did discuss IUD is the initial standard of care      Patient given educational materials - seepatient instructions. Discussed use, benefit, and side effects of prescribed medications. All patient questions answered. Pt voiced understanding. Patient agreed withtreatment plan. Follow up as directed.      Electronically signed by [facility], GERALDINE Hill CNP on 7/1/2021 at 5:27 PM

## 2021-07-07 LAB — CYTOLOGY THIN PREP PAP: NORMAL

## 2021-07-13 ENCOUNTER — NURSE ONLY (OUTPATIENT)
Dept: LAB | Age: 51
End: 2021-07-13

## 2021-07-25 ENCOUNTER — HOSPITAL ENCOUNTER (EMERGENCY)
Age: 51
Discharge: HOME | End: 2021-07-25
Attending: EMERGENCY MEDICINE
Payer: COMMERCIAL

## 2021-07-25 VITALS
BODY MASS INDEX: 33.32 KG/M2 | WEIGHT: 200 LBS | HEART RATE: 80 BPM | HEIGHT: 65 IN | OXYGEN SATURATION: 95 % | RESPIRATION RATE: 14 BRPM | TEMPERATURE: 97.6 F

## 2021-07-25 DIAGNOSIS — N30.01 ACUTE CYSTITIS WITH HEMATURIA: Primary | ICD-10-CM

## 2021-07-25 LAB
AMORPHOUS: ABNORMAL
BACTERIA: ABNORMAL
BILIRUBIN URINE: ABNORMAL
BLOOD, URINE: ABNORMAL
CASTS UA: ABNORMAL /LPF
CHARACTER, URINE: ABNORMAL
COLOR: ABNORMAL
CRYSTALS, UA: ABNORMAL
EPITHELIAL CELLS, UA: ABNORMAL /HPF
GLUCOSE, URINE: ABNORMAL MG/DL
KETONES, URINE: ABNORMAL
LEUKOCYTE ESTERASE, URINE: ABNORMAL
MUCUS: ABNORMAL
NITRITE, URINE: ABNORMAL
PH UA: ABNORMAL (ref 5–9)
PROTEIN UA: ABNORMAL MG/DL
RBC UA: > 200 /HPF
REFLEX TO URINE C & S: ABNORMAL
SPECIFIC GRAVITY UA: ABNORMAL (ref 1–1.03)
UROBILINOGEN, URINE: ABNORMAL EU/DL (ref 0–1)
WBC UA: ABNORMAL /HPF

## 2021-07-25 PROCEDURE — 87077 CULTURE AEROBIC IDENTIFY: CPT

## 2021-07-25 PROCEDURE — 99283 EMERGENCY DEPT VISIT LOW MDM: CPT

## 2021-07-25 PROCEDURE — 87086 URINE CULTURE/COLONY COUNT: CPT

## 2021-07-25 PROCEDURE — 87147 CULTURE TYPE IMMUNOLOGIC: CPT

## 2021-07-25 PROCEDURE — 87186 SC STD MICRODIL/AGAR DIL: CPT

## 2021-07-25 PROCEDURE — 81001 URINALYSIS AUTO W/SCOPE: CPT

## 2021-07-25 RX ORDER — PHENAZOPYRIDINE HYDROCHLORIDE 100 MG/1
100 TABLET, FILM COATED ORAL 3 TIMES DAILY PRN
COMMUNITY
End: 2022-05-11

## 2021-07-25 RX ORDER — SULFAMETHOXAZOLE AND TRIMETHOPRIM 800; 160 MG/1; MG/1
1 TABLET ORAL 2 TIMES DAILY
Qty: 20 TABLET | Refills: 0 | Status: SHIPPED | OUTPATIENT
Start: 2021-07-25 | End: 2021-08-04

## 2021-07-25 ASSESSMENT — ENCOUNTER SYMPTOMS
ABDOMINAL PAIN: 0
BACK PAIN: 1
NAUSEA: 0
VOMITING: 0
COUGH: 0
SHORTNESS OF BREATH: 0

## 2021-07-25 ASSESSMENT — PAIN DESCRIPTION - LOCATION: LOCATION: PELVIS

## 2021-07-25 ASSESSMENT — PAIN - FUNCTIONAL ASSESSMENT: PAIN_FUNCTIONAL_ASSESSMENT: 0-10

## 2021-07-25 ASSESSMENT — PAIN SCALES - GENERAL: PAINLEVEL_OUTOF10: 4

## 2021-07-25 NOTE — ED NOTES
Pt presents w/ c/o uti. States that she has pressure and pain w/ urination since yesterday. Clean catch u/a collected. Urine slightly cloudy and orange (she has been taking pyridium).       Darion Rodriguez RN  07/25/21 6910

## 2021-07-25 NOTE — ED PROVIDER NOTES
[facility]  eMERGENCY dEPARTMENT eNCOUnter             Gema Gagnon 20 COMPLAINT    Chief Complaint   Patient presents with    Urinary Tract Infection       Nurses Notes reviewed and I agree except as noted in the HPI. HPI    Luis Strong is a 49 y.o. female who presents with burning and stinging with urination with urinary hesitancy and frequency for 5 days. She has pain across her low back. She has been taking Pyridium since yesterday. No nausea, vomiting, fever. Current pain level is 4/10, aching, pressure, constant. REVIEW OF SYSTEMS      Review of Systems   Constitutional: Positive for malaise/fatigue. Negative for fever. HENT: Negative for congestion. Respiratory: Negative for cough and shortness of breath. Cardiovascular: Negative for chest pain and palpitations. Gastrointestinal: Negative for abdominal pain, nausea and vomiting. Genitourinary: Positive for dysuria and urgency. Negative for hematuria. Musculoskeletal: Positive for back pain. Neurological: Positive for weakness. Negative for dizziness, focal weakness and headaches. All other systems reviewed and are negative. PAST MEDICAL HISTORY     has a past medical history of Abdominal pain, right upper quadrant, Arthritis, Calculus of gallbladder, Depression, Genetic testing, GERD (gastroesophageal reflux disease), Hypertension, and Migraine. SURGICAL HISTORY     has a past surgical history that includes Cholecystectomy, laparoscopic (N/A, 1/17/2020); [facility] US BREAST CYST ASPIRATION LEFT (9/21/2020); and US Breast Fine Needle Aspiration (Left, 09/21/2020).     CURRENT MEDICATIONS    Discharge Medication List as of 7/25/2021  1:36 PM      CONTINUE these medications which have NOT CHANGED    Details   phenazopyridine (PYRIDIUM) 100 MG tablet Take 100 mg by mouth 3 times daily as needed for PainHistorical Med      losartan (COZAAR) 100 MG tablet TAKE ONE TABLET DAILY, BY MOUTH., Disp-30 tablet, R-5Normal      metoprolol succinate (TOPROL XL) 25 MG extended release tablet TAKE ONE TABLET DAILY, BY MOUTH., Disp-30 tablet, R-5Normal      hydroCHLOROthiazide (HYDRODIURIL) 25 MG tablet TAKE ONE TABLET DAILY, BY MOUTH., Disp-30 tablet, R-5Normal      methotrexate (RHEUMATREX) 2.5 MG chemo tablet Take 10 mg by mouth once a weekHistorical Med      folic acid (FOLVITE) 1 MG tablet Take 1 mg by mouth dailyHistorical Med      Hydroxychloroquine Sulfate (PLAQUENIL PO) Take 1 tablet by mouth daily Historical Med      FLUoxetine (PROZAC) 20 MG capsule Take 20 mg by mouth daily 2 tabs daily             ALLERGIES    has No Known Allergies. FAMILY HISTORY    She indicated that her mother is alive. She indicated that her father is alive. She indicated that her brother is alive. She indicated that her maternal grandmother is . She indicated that her maternal grandfather is . She indicated that her paternal grandmother is . She indicated that her paternal grandfather is . She indicated that the status of her maternal aunt is unknown.   family history includes Arthritis in her paternal grandmother; Breast Cancer (age of onset: 54) in her maternal aunt; Breast Cancer (age of onset: 64) in her mother; Cancer (age of onset: 58) in her mother; Depression in her mother; Diabetes in her father, maternal grandfather, and paternal grandfather; Heart Disease in her maternal grandfather and maternal grandmother; High Blood Pressure in her maternal grandmother and mother; High Cholesterol in her maternal grandfather; Hypertension in her paternal grandfather; [place] in her father; No Known Problems in her brother; Other in her mother. SOCIAL HISTORY     reports that she has never smoked. She has never used smokeless tobacco. She reports current alcohol use. She reports that she does not use drugs.     PHYSICAL EXAM       INITIAL VITALS: Pulse 80   Temp 97.6 °F (36.4 °C) (Oral)   Resp 14   Ht 5' 5\" (1.651 m)   Wt 200 lb (90.7 kg)   LMP 07/24/2021   SpO2 95%   BMI 33.28 kg/m²        Physical Exam  Vitals and nursing note reviewed. Constitutional:       General: She is not in acute distress. Appearance: She is not toxic-appearing. HENT:      Nose: No congestion or rhinorrhea. Mouth/Throat:      Mouth: Mucous membranes are moist.      Pharynx: No posterior oropharyngeal erythema. Eyes:      Pupils: Pupils are equal, round, and reactive to light. Cardiovascular:      Rate and Rhythm: Normal rate and regular rhythm. Heart sounds: No murmur heard. Pulmonary:      Effort: Pulmonary effort is normal. No respiratory distress. Breath sounds: Normal breath sounds. Abdominal:      General: Bowel sounds are normal.      Palpations: Abdomen is soft. Tenderness: There is abdominal tenderness (Suprapubic, no rebound, guarding, mass. ). There is no right CVA tenderness or left CVA tenderness. Musculoskeletal:         General: No swelling or tenderness. Cervical back: Neck supple. Lymphadenopathy:      Cervical: No cervical adenopathy. Skin:     General: Skin is warm and dry. Neurological:      General: No focal deficit present. Mental Status: She is alert and oriented to person, place, and time. Psychiatric:         Behavior: Behavior normal.        LABS:     Labs Reviewed   URINE RT REFLEX TO CULTURE - Abnormal; Notable for the following components:       Result Value    Character, Urine TURBID (*)     All other components within normal limits   CULTURE, URINE   CULTURE, REFLEXED, URINE     Urinalysis inaccurate due to presence of Pyridium dye. Urine culture pending. Vitals:    Vitals:    07/25/21 1310   Pulse: 80   Resp: 14   Temp: 97.6 °F (36.4 °C)   TempSrc: Oral   SpO2: 95%   Weight: 200 lb (90.7 kg)   Height: 5' 5\" (1.651 m)       EMERGENCY DEPARTMENT COURSE:    General measures discussed. FINAL IMPRESSION      1.  Acute cystitis with hematuria        DISPOSITION/PLAN    DISPOSITION Decision To Discharge 07/25/2021 01:25:55 PM      PATIENT REFERRED TO:    GERALDINE Buck - CNP  [address]  [phone]      As needed      DISCHARGE MEDICATIONS:    Discharge Medication List as of 7/25/2021  1:36 PM      START taking these medications    Details   sulfamethoxazole-trimethoprim (BACTRIM DS) 800-160 MG per tablet Take 1 tablet by mouth 2 times daily for 10 days, Disp-20 tablet, R-0Normal                (Please note that portions of this note were completed with a voice recognition program.  Efforts were made to edit the dictations but occasionally words are mis-transcribed.)      Luann Luis MD  07/25/21 2002

## 2021-07-25 NOTE — ED NOTES
Pt alert and oriented. Respirations regular and easy. Prescriptions sent to pharmacy and pt instructed on. Discharge instructions reviewed. States understanding. Pt discharged in satisfactory condition.        Chet Matt RN  07/25/21 [id number]

## 2021-07-28 LAB
ORGANISM: ABNORMAL
URINE CULTURE, ROUTINE: ABNORMAL

## 2021-07-31 ENCOUNTER — HOSPITAL ENCOUNTER (EMERGENCY)
Age: 51
Discharge: HOME | End: 2021-07-31
Attending: EMERGENCY MEDICINE
Payer: COMMERCIAL

## 2021-07-31 ENCOUNTER — APPOINTMENT (OUTPATIENT)
Dept: ULTRASOUND IMAGING | Age: 51
End: 2021-07-31
Payer: COMMERCIAL

## 2021-07-31 ENCOUNTER — APPOINTMENT (OUTPATIENT)
Dept: GENERAL RADIOLOGY | Age: 51
End: 2021-07-31
Payer: COMMERCIAL

## 2021-07-31 VITALS
BODY MASS INDEX: 33.32 KG/M2 | DIASTOLIC BLOOD PRESSURE: 78 MMHG | TEMPERATURE: 98.7 F | HEART RATE: 68 BPM | HEIGHT: 65 IN | OXYGEN SATURATION: 99 % | RESPIRATION RATE: 16 BRPM | WEIGHT: 200 LBS | SYSTOLIC BLOOD PRESSURE: 125 MMHG

## 2021-07-31 DIAGNOSIS — N93.8 DYSFUNCTIONAL UTERINE BLEEDING: Primary | ICD-10-CM

## 2021-07-31 LAB
ANION GAP SERPL CALCULATED.3IONS-SCNC: 13 MEQ/L (ref 8–16)
BACTERIA: ABNORMAL /HPF
BASOPHILS # BLD: 0.6 %
BASOPHILS ABSOLUTE: 0 THOU/MM3 (ref 0–0.1)
BILIRUBIN URINE: ABNORMAL
BLOOD, URINE: NEGATIVE
BUN BLDV-MCNC: 9 MG/DL (ref 7–22)
CALCIUM SERPL-MCNC: 9.2 MG/DL (ref 8.5–10.5)
CASTS 2: ABNORMAL /LPF
CASTS UA: ABNORMAL /LPF
CHARACTER, URINE: CLEAR
CHLORIDE BLD-SCNC: 102 MEQ/L (ref 98–111)
CO2: 25 MEQ/L (ref 23–33)
COLOR: YELLOW
CREAT SERPL-MCNC: 0.6 MG/DL (ref 0.4–1.2)
CRYSTALS, UA: ABNORMAL
EOSINOPHIL # BLD: 2.4 %
EOSINOPHILS ABSOLUTE: 0.1 THOU/MM3 (ref 0–0.4)
EPITHELIAL CELLS, UA: ABNORMAL /HPF
ERYTHROCYTE [DISTWIDTH] IN BLOOD BY AUTOMATED COUNT: 13.3 % (ref 11.5–14.5)
ERYTHROCYTE [DISTWIDTH] IN BLOOD BY AUTOMATED COUNT: 43.6 FL (ref 35–45)
GFR SERPL CREATININE-BSD FRML MDRD: > 90 ML/MIN/1.73M2
GLUCOSE BLD-MCNC: 86 MG/DL (ref 70–108)
GLUCOSE URINE: NEGATIVE MG/DL
HCT VFR BLD CALC: 34.6 % (ref 37–47)
HEMOGLOBIN: 11.5 GM/DL (ref 12–16)
ICTOTEST: NEGATIVE
IMMATURE GRANS (ABS): 0.02 THOU/MM3 (ref 0–0.07)
IMMATURE GRANULOCYTES: 0.4 %
KETONES, URINE: 15
LEUKOCYTE ESTERASE, URINE: ABNORMAL
LYMPHOCYTES # BLD: 46.9 %
LYMPHOCYTES ABSOLUTE: 2.5 THOU/MM3 (ref 1–4.8)
MAGNESIUM: 1.7 MG/DL (ref 1.6–2.4)
MCH RBC QN AUTO: 30.4 PG (ref 26–33)
MCHC RBC AUTO-ENTMCNC: 33.2 GM/DL (ref 32.2–35.5)
MCV RBC AUTO: 91.5 FL (ref 81–99)
MISCELLANEOUS 2: ABNORMAL
MONOCYTES # BLD: 6.4 %
MONOCYTES ABSOLUTE: 0.3 THOU/MM3 (ref 0.4–1.3)
MUCUS: ABNORMAL
NITRITE, URINE: NEGATIVE
NUCLEATED RED BLOOD CELLS: 0 /100 WBC
OSMOLALITY CALCULATION: 277.4 MOSMOL/KG (ref 275–300)
PH UA: 5.5 (ref 5–9)
PLATELET # BLD: 297 THOU/MM3 (ref 130–400)
PMV BLD AUTO: 9.8 FL (ref 9.4–12.4)
POTASSIUM REFLEX MAGNESIUM: 3.4 MEQ/L (ref 3.5–5.2)
PREGNANCY, SERUM: NEGATIVE
PROTEIN UA: 100
RBC # BLD: 3.78 MILL/MM3 (ref 4.2–5.4)
RBC URINE: ABNORMAL /HPF
RENAL EPITHELIAL, UA: ABNORMAL
SEG NEUTROPHILS: 43.3 %
SEGMENTED NEUTROPHILS ABSOLUTE COUNT: 2.3 THOU/MM3 (ref 1.8–7.7)
SODIUM BLD-SCNC: 140 MEQ/L (ref 135–145)
SPECIFIC GRAVITY, URINE: > 1.03 (ref 1–1.03)
UROBILINOGEN, URINE: 1 EU/DL (ref 0–1)
WBC # BLD: 5.4 THOU/MM3 (ref 4.8–10.8)
WBC UA: ABNORMAL /HPF
YEAST: ABNORMAL

## 2021-07-31 PROCEDURE — 96375 TX/PRO/DX INJ NEW DRUG ADDON: CPT

## 2021-07-31 PROCEDURE — 83735 ASSAY OF MAGNESIUM: CPT

## 2021-07-31 PROCEDURE — 6360000002 HC RX W HCPCS: Performed by: EMERGENCY MEDICINE

## 2021-07-31 PROCEDURE — 99285 EMERGENCY DEPT VISIT HI MDM: CPT

## 2021-07-31 PROCEDURE — 76830 TRANSVAGINAL US NON-OB: CPT

## 2021-07-31 PROCEDURE — 80048 BASIC METABOLIC PNL TOTAL CA: CPT

## 2021-07-31 PROCEDURE — 85025 COMPLETE CBC W/AUTO DIFF WBC: CPT

## 2021-07-31 PROCEDURE — 81001 URINALYSIS AUTO W/SCOPE: CPT

## 2021-07-31 PROCEDURE — 36415 COLL VENOUS BLD VENIPUNCTURE: CPT

## 2021-07-31 PROCEDURE — 96374 THER/PROPH/DIAG INJ IV PUSH: CPT

## 2021-07-31 PROCEDURE — 87086 URINE CULTURE/COLONY COUNT: CPT

## 2021-07-31 PROCEDURE — 74018 RADEX ABDOMEN 1 VIEW: CPT

## 2021-07-31 PROCEDURE — 84703 CHORIONIC GONADOTROPIN ASSAY: CPT

## 2021-07-31 RX ORDER — ONDANSETRON 2 MG/ML
4 INJECTION INTRAMUSCULAR; INTRAVENOUS ONCE
Status: COMPLETED | OUTPATIENT
Start: 2021-07-31 | End: 2021-07-31

## 2021-07-31 RX ORDER — KETOROLAC TROMETHAMINE 30 MG/ML
15 INJECTION, SOLUTION INTRAMUSCULAR; INTRAVENOUS ONCE
Status: COMPLETED | OUTPATIENT
Start: 2021-07-31 | End: 2021-07-31

## 2021-07-31 RX ORDER — NAPROXEN 500 MG/1
500 TABLET ORAL 2 TIMES DAILY
Qty: 20 TABLET | Refills: 0 | Status: SHIPPED | OUTPATIENT
Start: 2021-07-31

## 2021-07-31 RX ORDER — ONDANSETRON 4 MG/1
4 TABLET, ORALLY DISINTEGRATING ORAL EVERY 8 HOURS PRN
Qty: 20 TABLET | Refills: 0 | Status: SHIPPED | OUTPATIENT
Start: 2021-07-31 | End: 2022-05-11

## 2021-07-31 RX ADMIN — KETOROLAC TROMETHAMINE 15 MG: 30 INJECTION, SOLUTION INTRAMUSCULAR; INTRAVENOUS at 16:07

## 2021-07-31 RX ADMIN — ONDANSETRON 4 MG: 2 INJECTION INTRAMUSCULAR; INTRAVENOUS at 16:07

## 2021-07-31 ASSESSMENT — ENCOUNTER SYMPTOMS
ABDOMINAL PAIN: 1
CONSTIPATION: 0
EYE REDNESS: 0
VOMITING: 0
DIARRHEA: 0
RHINORRHEA: 0
CHEST TIGHTNESS: 0
BACK PAIN: 0
COUGH: 0
NAUSEA: 1
WHEEZING: 0
SORE THROAT: 0
SHORTNESS OF BREATH: 0

## 2021-07-31 ASSESSMENT — PAIN SCALES - GENERAL: PAINLEVEL_OUTOF10: 4

## 2021-07-31 NOTE — ED PROVIDER NOTES
[address] [address]          Pt Name: Hortencia Hernandez  MRN: [id number]  [place] 1970  Date of evaluation: 7/31/2021  Emergency Physician: Manolo Issa, [facility]       Chief Complaint   Patient presents with    Vaginal Bleeding    Nausea     History obtained from the patient. HISTORY OF PRESENT ILLNESS    HPI  Amie Nails is a 49 y.o. female who presents to the emergency department for evaluation of vaginal bleeding. Symptoms have been ongoing over the last 7 days. She states she is soaking through a tampon and pad every 2-3 hours. Patient had had a Mirena placed 2 weeks ago per Dr. Ashley Alonso. She states she has had intermittent lower abdominal cramping and nausea. She reports her symptoms as mild to moderate. She states she called the office and was referred to the ED to obtain an ultrasound. The patient has no other acute complaints at this time. REVIEW OF SYSTEMS   Review of Systems   Constitutional: Negative for chills and fever. HENT: Negative for congestion, rhinorrhea and sore throat. Eyes: Negative for redness and visual disturbance. Respiratory: Negative for cough, chest tightness, shortness of breath and wheezing. Cardiovascular: Negative for chest pain and leg swelling. Gastrointestinal: Positive for abdominal pain and nausea. Negative for constipation, diarrhea and vomiting. Endocrine: Negative for polydipsia and polyuria. Genitourinary: Positive for pelvic pain and vaginal bleeding. Negative for decreased urine volume, dysuria and urgency. Musculoskeletal: Negative for back pain and myalgias. Skin: Negative for rash and wound. Neurological: Negative for weakness, light-headedness and headaches. Hematological: Does not bruise/bleed easily. Psychiatric/Behavioral: Negative for decreased concentration and dysphoric mood.          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 BREAST CYST ASPIRATION LEFT 9/21/2020 [id number] NEEDLE ASPIRATION Left 09/21/2020         MEDICATIONS     Current Facility-Administered Medications:     ketorolac (TORADOL) injection 15 mg, 15 mg, Intravenous, Once, Estella Boros, DO    ondansetron [facility]) injection 4 mg, 4 mg, Intravenous, Once, Estella Gaudencioos, DO    Current Outpatient Medications:     sulfamethoxazole-trimethoprim (BACTRIM DS) 800-160 MG per tablet, Take 1 tablet by mouth 2 times daily for 10 days, Disp: 20 tablet, Rfl: 0    losartan (COZAAR) 100 MG tablet, TAKE ONE TABLET DAILY, BY MOUTH., Disp: 30 tablet, Rfl: 5    metoprolol succinate (TOPROL XL) 25 MG extended release tablet, TAKE ONE TABLET DAILY, BY MOUTH., Disp: 30 tablet, Rfl: 5    hydroCHLOROthiazide (HYDRODIURIL) 25 MG tablet, TAKE ONE TABLET DAILY, BY MOUTH., Disp: 30 tablet, Rfl: 5    methotrexate (RHEUMATREX) 2.5 MG chemo tablet, Take 10 mg by mouth once a week, Disp: , Rfl:     folic acid (FOLVITE) 1 MG tablet, Take 1 mg by mouth daily, Disp: , Rfl:     Hydroxychloroquine Sulfate (PLAQUENIL PO), Take 1 tablet by mouth daily , Disp: , Rfl:     FLUoxetine (PROZAC) 20 MG capsule, Take 20 mg by mouth daily 2 tabs daily, Disp: , Rfl:     phenazopyridine (PYRIDIUM) 100 MG tablet, Take 100 mg by mouth 3 times daily as needed for Pain, Disp: , Rfl:       SOCIAL HISTORY Social History     Social History Narrative    Not on file     Social History     Tobacco Use    Smoking status: Never Smoker    Smokeless tobacco: Never Used   Vaping Use    Vaping Use: Never used   Substance Use Topics    Alcohol use: Not Currently     Alcohol/week: 0.0 standard drinks     Comment: social    Drug use: No         ALLERGIES   No Known Allergies      FAMILY HISTORY     Family History   Problem Relation Age of Onset    Cancer Mother 58        renal cancer    Depression Mother         SAD    High Blood Pressure Mother     Other Mother         gallbladder disease    Breast Cancer Mother 64        2nd time age 61, 2rd time age 71 (metastatic at this time)    Diabetes Father     Lung Cancer Father     Heart Disease Maternal Grandmother     High Blood Pressure Maternal Grandmother     Diabetes Maternal Grandfather     Heart Disease Maternal Grandfather     High Cholesterol Maternal Grandfather     No Known Problems Brother     Arthritis Paternal Grandmother         RA    Diabetes Paternal Grandfather     Hypertension Paternal Grandfather     Breast Cancer Maternal Aunt 54         PHYSICAL EXAM     ED Triage Vitals [07/31/21 1442]   BP Temp Temp Source Pulse Resp SpO2 Height Weight   (!) 150/100 98.7 °F (37.1 °C) Oral 87 16 98 % 5' 5\" (1.651 m) 200 lb (90.7 kg)         Additional Vital Signs:  Vitals:    07/31/21 1442   BP: (!) 150/100   Pulse: 87   Resp: 16   Temp: 98.7 °F (37.1 °C)   SpO2: 98%       Physical Exam  Constitutional:       General: She is not in acute distress. Appearance: She is well-developed. She is not diaphoretic. HENT:      Head: Normocephalic and atraumatic. Eyes:      General:         Right eye: No discharge. Left eye: No discharge. Conjunctiva/sclera: Conjunctivae normal.      Pupils: Pupils are equal, round, and reactive to light. Neck:      Thyroid: No thyromegaly. Vascular: No JVD.    Cardiovascular:      Rate and Rhythm: Normal rate and regular rhythm. Heart sounds: Normal heart sounds. Pulmonary:      Effort: Pulmonary effort is normal. No respiratory distress. Breath sounds: Normal breath sounds. Abdominal:      General: Bowel sounds are normal. There is no distension. Palpations: Abdomen is soft. Tenderness: There is no abdominal tenderness. Musculoskeletal:         General: No tenderness. Normal range of motion. Cervical back: Normal range of motion and neck supple. Lymphadenopathy:      Cervical: No cervical adenopathy. Skin:     General: Skin is warm and dry. Capillary Refill: Capillary refill takes less than 2 seconds. Findings: No rash. Neurological:      Mental Status: She is alert and oriented to person, place, and time. She is not disoriented. GCS: GCS eye subscore is 4. GCS verbal subscore is 5. GCS motor subscore is 6. Motor: No abnormal muscle tone or seizure activity. Gait: Gait normal.      Comments: Awake and alert. Moves all extremities. Non-focal exam with steady gait. Initial vital signs and nursing assessment reviewed and abnormal from Elevated blood pressure uncertain chronicity. .   Pulsoximetry is normal per my interpretation. MEDICAL DECISION MAKING   Initial Assessment: Given the patient's above chief complaint and findings on history and physical examination, I thought it was appropriate to consider the following emergency medical conditions: Dysfunctional uterine bleeding, menorrhagia, metromenorrhagia, uterine fibroids, UTI, GI bleed, although some of these diagnoses are unlikely they were considered in my medical decision making.     Plan: CBC, BMP, ECG, KUB, pelvic ultrasound symptomatic treatment with Toradol, Zofran and reassess         ED RESULTS   Laboratory results:  Labs Reviewed   CBC WITH AUTO DIFFERENTIAL - Abnormal; Notable for the following components:       Result Value    RBC 3.78 (*)     Hemoglobin 11.5 (*) Hematocrit 34.6 (*)     Monocytes Absolute 0.3 (*)     All other components within normal limits   BASIC METABOLIC PANEL W/ REFLEX TO MG FOR LOW K - Abnormal; Notable for the following components:    Potassium reflex Magnesium 3.4 (*)     All other components within normal limits   ANION GAP   GLOMERULAR FILTRATION RATE, ESTIMATED   MAGNESIUM   OSMOLALITY   URINE RT REFLEX TO CULTURE   HCG, SERUM, QUALITATIVE       Radiologic studies results:  US NON OB TRANSVAGINAL   Final Result   Intrauterine device is present in the midline of the uterine canal.   2 separate lesions in the uterine myometrium compatible with uterine fibroids. 3. Small visualized follicles both ovaries. **This report has been created using voice recognition software. It may contain minor errors which are inherent in voice recognition technology. **      Final report electronically signed by Dr. Carol Correia on 7/31/2021 6:11 PM      XR ABDOMEN (KUB) (SINGLE AP VIEW)   Final Result   No acute abdominal pathology            **This report has been created using voice recognition software. It may contain minor errors which are inherent in voice recognition technology. **      Final report electronically signed by Dr. Carol Correia on 7/31/2021 3:55 PM          ED Medications administered this visit:   Medications   ketorolac (TORADOL) injection 15 mg (has no administration in time range)   ondansetron (ZOFRAN) injection 4 mg (has no administration in time range)         ED COURSE    Patient without significant bleeding in the ED. She remained hemodynamically stable. Hemoglobin is 11.5. Ultrasound demonstrates midline device in canal.  Findings consistent with uterine fibroids. She is to follow-up with her primary doctor on Monday. The diagnosis, extensive differential diagnosis, laboratory and imaging findings were discussed at the bedside. The patient was an active participant in their care. They are agreeable to the plan of care.  All

## 2021-07-31 NOTE — ED TRIAGE NOTES
Patient presents to ER with complaints of vaginal bleeding and nausea that started last Saturday. Patient reports she had an IUD placed 2 weeks ago do to having 2 tumors on uterus. Patient denies pain.

## 2021-08-02 LAB — URINE CULTURE REFLEX: NORMAL

## 2021-09-21 ENCOUNTER — HOSPITAL ENCOUNTER (OUTPATIENT)
Age: 51
End: 2021-09-21
Payer: COMMERCIAL

## 2021-10-06 ENCOUNTER — TELEPHONE (OUTPATIENT)
Dept: FAMILY MEDICINE CLINIC | Age: 51
End: 2021-10-06

## 2021-10-06 RX ORDER — VALACYCLOVIR HYDROCHLORIDE 1 G/1
2000 TABLET, FILM COATED ORAL 2 TIMES DAILY
Qty: 20 TABLET | Refills: 0 | Status: SHIPPED | OUTPATIENT
Start: 2021-10-06 | End: 2022-05-11 | Stop reason: SDUPTHER

## 2021-10-06 NOTE — TELEPHONE ENCOUNTER
Feliberto Moore [facility] Clinical Support Pool  Subject: Refill Request     QUESTIONS   Name of Medication? Other - Valtrex   Patient-reported dosage and instructions? PT does not know the dosage /   Take as needed until it goes away   How many days do you have left? 0   Preferred Pharmacy? [address] phone number (if available)? [phone]   Additional Information for Provider? PT says the cold sores are coming   back more often.   ---------------------------------------------------------------------------   --------------   CALL BACK INFO   What is the best way for the office to contact you? OK to leave message on   voicemail   Preferred Call Back Phone Number?  [phone]

## 2021-10-06 NOTE — TELEPHONE ENCOUNTER
Patient called and wanted a refill for valtrex. Pt. States she hasn't had it in a few years but recently has been getting a lot of cold sores.

## 2021-10-12 ENCOUNTER — TELEPHONE (OUTPATIENT)
Dept: FAMILY MEDICINE CLINIC | Age: 51
End: 2021-10-12

## 2021-10-12 DIAGNOSIS — Z11.52 ENCOUNTER FOR SCREENING FOR COVID-19: Primary | ICD-10-CM

## 2021-10-12 LAB — SARS-COV-2: NEGATIVE

## 2021-10-12 NOTE — TELEPHONE ENCOUNTER
----- Message from [facility] sent at 10/12/2021  8:54 AM EDT -----  Subject: Referral Request    QUESTIONS   Reason for referral request? Patient is needing a covid test completed to   be able to travel via airplane. Has the physician seen you for this condition before? No   Preferred Specialist (if applicable)? Do you already have an appointment scheduled? Additional Information for Provider? Patient requested a call back at   [phone] messages okay   ---------------------------------------------------------------------------  --------------  CALL BACK INFO  What is the best way for the office to contact you? OK to leave message on   voicemail  Preferred Call Back Phone Number?  [phone]

## 2021-10-14 ENCOUNTER — ANTI-COAG VISIT (OUTPATIENT)
Dept: FAMILY MEDICINE CLINIC | Age: 51
End: 2021-10-14

## 2021-10-14 DIAGNOSIS — Z11.52 ENCOUNTER FOR SCREENING FOR COVID-19: ICD-10-CM

## 2021-11-15 ENCOUNTER — HOSPITAL ENCOUNTER (OUTPATIENT)
Dept: MAMMOGRAPHY | Age: 51
Discharge: HOME | End: 2021-11-15
Payer: COMMERCIAL

## 2021-11-15 DIAGNOSIS — Z12.39 SCREENING BREAST EXAMINATION: ICD-10-CM

## 2021-11-15 PROCEDURE — 77063 BREAST TOMOSYNTHESIS BI: CPT

## 2021-12-20 ENCOUNTER — NURSE ONLY (OUTPATIENT)
Dept: LAB | Age: 51
End: 2021-12-20

## 2021-12-20 LAB
BASOPHILS # BLD: 0.4 %
BASOPHILS ABSOLUTE: 0 THOU/MM3 (ref 0–0.1)
EOSINOPHIL # BLD: 1.3 %
EOSINOPHILS ABSOLUTE: 0.1 THOU/MM3 (ref 0–0.4)
ERYTHROCYTE [DISTWIDTH] IN BLOOD BY AUTOMATED COUNT: 14 % (ref 11.5–14.5)
ERYTHROCYTE [DISTWIDTH] IN BLOOD BY AUTOMATED COUNT: 47.1 FL (ref 35–45)
HCT VFR BLD CALC: 39.1 % (ref 37–47)
HEMOGLOBIN: 12.9 GM/DL (ref 12–16)
IMMATURE GRANS (ABS): 0.02 THOU/MM3 (ref 0–0.07)
IMMATURE GRANULOCYTES: 0.3 %
LYMPHOCYTES # BLD: 27 %
LYMPHOCYTES ABSOLUTE: 1.8 THOU/MM3 (ref 1–4.8)
MCH RBC QN AUTO: 30.4 PG (ref 26–33)
MCHC RBC AUTO-ENTMCNC: 33 GM/DL (ref 32.2–35.5)
MCV RBC AUTO: 92.2 FL (ref 81–99)
MONOCYTES # BLD: 7 %
MONOCYTES ABSOLUTE: 0.5 THOU/MM3 (ref 0.4–1.3)
NUCLEATED RED BLOOD CELLS: 0 /100 WBC
PLATELET # BLD: 309 THOU/MM3 (ref 130–400)
PMV BLD AUTO: 9.8 FL (ref 9.4–12.4)
RBC # BLD: 4.24 MILL/MM3 (ref 4.2–5.4)
SEG NEUTROPHILS: 64 %
SEGMENTED NEUTROPHILS ABSOLUTE COUNT: 4.3 THOU/MM3 (ref 1.8–7.7)
WBC # BLD: 6.7 THOU/MM3 (ref 4.8–10.8)

## 2021-12-21 LAB
ALT SERPL-CCNC: 11 U/L (ref 11–66)
CREAT SERPL-MCNC: 0.8 MG/DL (ref 0.4–1.2)
GFR SERPL CREATININE-BSD FRML MDRD: 76 ML/MIN/1.73M2
SEDIMENTATION RATE, ERYTHROCYTE: 8 MM/HR (ref 0–20)

## 2022-01-19 ENCOUNTER — TELEPHONE (OUTPATIENT)
Dept: FAMILY MEDICINE CLINIC | Age: 52
End: 2022-01-19

## 2022-01-19 ENCOUNTER — TELEPHONE (OUTPATIENT)
Dept: CASE MANAGEMENT | Age: 52
End: 2022-01-19

## 2022-01-19 DIAGNOSIS — Z91.89 HIGH RISK FOR COLON CANCER: ICD-10-CM

## 2022-01-19 DIAGNOSIS — Z12.11 SCREEN FOR COLON CANCER: Primary | ICD-10-CM

## 2022-01-19 NOTE — TELEPHONE ENCOUNTER
Name: Farshad Murguia  : 1970  MRN: [id number]    Oncology Navigation Follow-Up Note    Contact Type:  Telephone    Notes: Had previous genetic testing done with [facility] in [place] 2021. 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 Patient expressing she wants [facility], no preference in doctor, and needs colonoscopy. *Patient never had one and is age 49. Called [facility], primary provider with referral request. Patient aware office will be in touch soon with information.     Electronically signed by Rianna Vang RN on 2022 at 9:31 AM

## 2022-01-19 NOTE — TELEPHONE ENCOUNTER
[facility] would like referral [facility] for [facility]. Patient had genetic testing completed in 2020 and was found to carry a gene that puts patient at higher risk for colon cancer.       Testing and notes re: genetic testing are in chart

## 2022-02-15 ENCOUNTER — CLINICAL DOCUMENTATION (OUTPATIENT)
Dept: CASE MANAGEMENT | Age: 52
End: 2022-02-15

## 2022-02-15 NOTE — PROGRESS NOTES
Name: Gregoria Honeycutt  : 1970  MRN: [id number]    Oncology Navigation Follow-Up Note    Contact Type:  Medical Oncology    Notes: Referred to Dr. Hoda Daniel by [facility] for screening colonoscopy, has MUTYH PATHOGENIC MUTATION. Faxed genetic results and [facility] post-counseling documentation with medical management recommendations to GI office.     Electronically signed by Myrtle Dolan RN on 2/15/2022 at 10:01 AM

## 2022-02-17 DIAGNOSIS — I10 ESSENTIAL HYPERTENSION: ICD-10-CM

## 2022-02-17 RX ORDER — LOSARTAN POTASSIUM 100 MG/1
TABLET ORAL
Qty: 30 TABLET | Refills: 0 | Status: SHIPPED | OUTPATIENT
Start: 2022-02-17 | End: 2022-04-18

## 2022-02-17 RX ORDER — METOPROLOL SUCCINATE 25 MG/1
TABLET, EXTENDED RELEASE ORAL
Qty: 30 TABLET | Refills: 0 | Status: SHIPPED | OUTPATIENT
Start: 2022-02-17 | End: 2022-04-18

## 2022-02-17 RX ORDER — HYDROCHLOROTHIAZIDE 25 MG/1
TABLET ORAL
Qty: 30 TABLET | Refills: 0 | Status: SHIPPED | OUTPATIENT
Start: 2022-02-17 | End: 2022-04-18

## 2022-03-29 ENCOUNTER — TELEPHONE (OUTPATIENT)
Dept: FAMILY MEDICINE CLINIC | Age: 52
End: 2022-03-29

## 2022-03-29 DIAGNOSIS — Z12.11 SCREEN FOR COLON CANCER: Primary | ICD-10-CM

## 2022-03-29 NOTE — TELEPHONE ENCOUNTER
----- Message from Lorraine Dow sent at 3/29/2022 10:47 AM EDT -----  Subject: Message to Provider    QUESTIONS  Information for Provider? PT called in, she has gotten a call that she   needs to get a colonoscopy done due to her risk of getting colon cancer. PT would like to get an order for this, PT would like to get this   scheduled with a call back. ---------------------------------------------------------------------------  --------------  Lear How INFO  What is the best way for the office to contact you? Do not leave any   message, patient will call back for answer  Preferred Call Back Phone Number? [phone]  ---------------------------------------------------------------------------  --------------  SCRIPT ANSWERS  Relationship to Patient?  Self

## 2022-04-18 DIAGNOSIS — I10 ESSENTIAL HYPERTENSION: ICD-10-CM

## 2022-04-18 RX ORDER — HYDROCHLOROTHIAZIDE 25 MG/1
TABLET ORAL
Qty: 30 TABLET | Refills: 1 | Status: SHIPPED | OUTPATIENT
Start: 2022-04-18 | End: 2022-08-09

## 2022-04-18 RX ORDER — METOPROLOL SUCCINATE 25 MG/1
TABLET, EXTENDED RELEASE ORAL
Qty: 30 TABLET | Refills: 1 | Status: SHIPPED | OUTPATIENT
Start: 2022-04-18 | End: 2022-08-09

## 2022-04-18 RX ORDER — LOSARTAN POTASSIUM 100 MG/1
TABLET ORAL
Qty: 30 TABLET | Refills: 1 | Status: SHIPPED | OUTPATIENT
Start: 2022-04-18 | End: 2022-08-09

## 2022-05-10 ENCOUNTER — TELEPHONE (OUTPATIENT)
Dept: FAMILY MEDICINE CLINIC | Age: 52
End: 2022-05-10

## 2022-05-10 ENCOUNTER — NURSE ONLY (OUTPATIENT)
Dept: LAB | Age: 52
End: 2022-05-10

## 2022-05-10 LAB
BASOPHILS # BLD: 0.3 %
BASOPHILS ABSOLUTE: 0 THOU/MM3 (ref 0–0.1)
EOSINOPHIL # BLD: 1.8 %
EOSINOPHILS ABSOLUTE: 0.1 THOU/MM3 (ref 0–0.4)
ERYTHROCYTE [DISTWIDTH] IN BLOOD BY AUTOMATED COUNT: 13.6 % (ref 11.5–14.5)
ERYTHROCYTE [DISTWIDTH] IN BLOOD BY AUTOMATED COUNT: 45.9 FL (ref 35–45)
HCT VFR BLD CALC: 39.9 % (ref 37–47)
HEMOGLOBIN: 13.1 GM/DL (ref 12–16)
IMMATURE GRANS (ABS): 0.02 THOU/MM3 (ref 0–0.07)
IMMATURE GRANULOCYTES: 0.3 %
LYMPHOCYTES # BLD: 36.1 %
LYMPHOCYTES ABSOLUTE: 2.4 THOU/MM3 (ref 1–4.8)
MCH RBC QN AUTO: 30.8 PG (ref 26–33)
MCHC RBC AUTO-ENTMCNC: 32.8 GM/DL (ref 32.2–35.5)
MCV RBC AUTO: 93.9 FL (ref 81–99)
MONOCYTES # BLD: 8.8 %
MONOCYTES ABSOLUTE: 0.6 THOU/MM3 (ref 0.4–1.3)
NUCLEATED RED BLOOD CELLS: 0 /100 WBC
PLATELET # BLD: 356 THOU/MM3 (ref 130–400)
PMV BLD AUTO: 9.7 FL (ref 9.4–12.4)
RBC # BLD: 4.25 MILL/MM3 (ref 4.2–5.4)
SEG NEUTROPHILS: 52.7 %
SEGMENTED NEUTROPHILS ABSOLUTE COUNT: 3.5 THOU/MM3 (ref 1.8–7.7)
WBC # BLD: 6.7 THOU/MM3 (ref 4.8–10.8)

## 2022-05-10 NOTE — TELEPHONE ENCOUNTER
I need the dose. May just need higher dose.   Also recommend visit as we would want to know why it is not working now and previously was

## 2022-05-10 NOTE — TELEPHONE ENCOUNTER
Patient stopped in at lunch stating the nexium she takes daily(unsure of dose) isnt working. \"I still feel it in my throat\". Is there something else she can take?  Call pt    mariya

## 2022-05-11 ENCOUNTER — OFFICE VISIT (OUTPATIENT)
Dept: FAMILY MEDICINE CLINIC | Age: 52
End: 2022-05-11
Payer: COMMERCIAL

## 2022-05-11 VITALS
TEMPERATURE: 97.6 F | HEART RATE: 78 BPM | WEIGHT: 219 LBS | OXYGEN SATURATION: 97 % | SYSTOLIC BLOOD PRESSURE: 126 MMHG | BODY MASS INDEX: 36.49 KG/M2 | RESPIRATION RATE: 16 BRPM | HEIGHT: 65 IN | DIASTOLIC BLOOD PRESSURE: 88 MMHG

## 2022-05-11 DIAGNOSIS — K20.0 EOSINOPHILIC ESOPHAGITIS: Primary | ICD-10-CM

## 2022-05-11 DIAGNOSIS — K21.00 GASTROESOPHAGEAL REFLUX DISEASE WITH ESOPHAGITIS, UNSPECIFIED WHETHER HEMORRHAGE: ICD-10-CM

## 2022-05-11 DIAGNOSIS — Z13.220 SCREENING FOR LIPID DISORDERS: ICD-10-CM

## 2022-05-11 LAB
ALT SERPL-CCNC: 13 U/L (ref 11–66)
CREAT SERPL-MCNC: 0.7 MG/DL (ref 0.4–1.2)
GFR SERPL CREATININE-BSD FRML MDRD: 88 ML/MIN/1.73M2
SEDIMENTATION RATE, ERYTHROCYTE: 11 MM/HR (ref 0–20)

## 2022-05-11 PROCEDURE — 99214 OFFICE O/P EST MOD 30 MIN: CPT | Performed by: NURSE PRACTITIONER

## 2022-05-11 RX ORDER — OMEPRAZOLE 40 MG/1
40 CAPSULE, DELAYED RELEASE ORAL 2 TIMES DAILY
Qty: 180 CAPSULE | Refills: 1 | Status: SHIPPED | OUTPATIENT
Start: 2022-05-11

## 2022-05-11 RX ORDER — VALACYCLOVIR HYDROCHLORIDE 1 G/1
2000 TABLET, FILM COATED ORAL 2 TIMES DAILY
Qty: 20 TABLET | Refills: 0 | Status: SHIPPED | OUTPATIENT
Start: 2022-05-11 | End: 2022-05-12

## 2022-05-11 ASSESSMENT — ENCOUNTER SYMPTOMS
EYES NEGATIVE: 1
TROUBLE SWALLOWING: 1
RESPIRATORY NEGATIVE: 1
GASTROINTESTINAL NEGATIVE: 1

## 2022-05-11 NOTE — PROGRESS NOTES
Rj Harvey is a 47 y.o. female whopresents today for :  Chief Complaint   Patient presents with    Medication Check       HPI:     HPI  Pt here for fu. Pt reports long standing history of gerd/ eosinophilic esophagitis. Has been using nexium 20mg with relief for some time. Reports that in the past week has not been well. Ate a steak last week and since has been much worse. Does not feel like the steak was stuck.   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    GERD (gastroesophageal reflux disease)     Hypertension     Migraine       Past Surgical History:   Procedure Laterality Date    CHOLECYSTECTOMY, LAPAROSCOPIC N/A 1/17/2020    ROBOT LAP ROSHAN, UMBILICAL HERNIA REPAIR performed by Dalia Cummings MD at [place] [address] LEFT  9/21/2020    [facility] BREAST CYST ASPIRATION LEFT 9/21/2020 [id number] [facility] NEEDLE ASPIRATION Left 09/21/2020     Family History   Problem Relation Age of Onset    Cancer Mother 58        renal cancer    Depression Mother         SAD    High Blood Pressure Mother     Other Mother         gallbladder disease    Breast Cancer Mother 64        2nd time age 61, 2rd time age 71 (metastatic at this time)    Diabetes Father     Lung Cancer Father     Heart Disease Maternal Grandmother     High Blood Pressure Maternal Grandmother     Diabetes Maternal Grandfather     Heart Disease Maternal Grandfather     High Cholesterol Maternal Grandfather     No Known Problems Brother     Arthritis Paternal Grandmother         RA    Diabetes Paternal Grandfather     Hypertension Paternal Grandfather     Breast Cancer Maternal Aunt 54     Social History     Tobacco Use    Smoking status: Never Smoker    Smokeless tobacco: Never Used   Substance Use Topics    Alcohol use: Not Currently     Alcohol/week: 0.0 standard drinks     Comment: social      Current Outpatient Medications   Medication Sig Dispense Refill    omeprazole (PRILOSEC) 40 MG delayed release capsule Take 1 capsule by mouth in the morning and at bedtime 180 capsule 1    valACYclovir (VALTREX) 1 g tablet Take 2 tablets by mouth 2 times daily for 1 day 20 tablet 0    losartan (COZAAR) 100 MG tablet TAKE ONE TABLET DAILY, BY MOUTH. 30 tablet 1    hydroCHLOROthiazide (HYDRODIURIL) 25 MG tablet TAKE ONE TABLET DAILY, BY MOUTH. 30 tablet 1    metoprolol succinate (TOPROL XL) 25 MG extended release tablet TAKE ONE TABLET DAILY, BY MOUTH. 30 tablet 1    naproxen (NAPROSYN) 500 MG tablet Take 1 tablet by mouth 2 times daily 20 tablet 0    methotrexate (RHEUMATREX) 2.5 MG chemo tablet Take 10 mg by mouth once a week      folic acid (FOLVITE) 1 MG tablet Take 1 mg by mouth daily      FLUoxetine (PROZAC) 20 MG capsule Take 20 mg by mouth daily 2 tabs daily       No current facility-administered medications for this visit.      No Known Allergies  Health Maintenance   Topic Date Due    HIV screen  Never done    DTaP/Tdap/Td vaccine (1 - Tdap) Never done    Lipids  Never done    Colorectal Cancer Screen  02/06/2019    Shingles vaccine (1 of 2) Never done    COVID-19 Vaccine (1) 05/06/2023 (Originally 5/1/1975)    Depression Screen  07/01/2022    Flu vaccine (Season Ended) 09/01/2022    Breast cancer screen  11/15/2022    Cervical cancer screen  06/25/2024    Hepatitis C screen  Completed    Hepatitis A vaccine  Aged Out    Hepatitis B vaccine  Aged Out    Hib vaccine  Aged Out    Meningococcal (ACWY) vaccine  Aged Out    Pneumococcal 0-64 years Vaccine  Aged Out       Subjective:     Review of Systems   Constitutional: Negative. HENT: Positive for trouble swallowing. Eyes: Negative. Respiratory: Negative. Cardiovascular: Negative. Gastrointestinal: Negative. Musculoskeletal: Negative. Skin: Negative. Neurological: Negative. Objective:     Vitals:    05/11/22 0837   BP: 126/88   Site: Right Upper Arm   Position: Sitting   Cuff Size: Large Adult   Pulse: 78   Resp: 16   Temp: 97.6 °F (36.4 °C)   TempSrc: Temporal   SpO2: 97%   Weight: 219 lb (99.3 kg)   Height: 5' 5\" (1.651 m)       Physical Exam  Constitutional:       Appearance: She is well-developed. HENT:      Head: Normocephalic. Right Ear: Tympanic membrane and external ear normal.      Left Ear: Tympanic membrane and external ear normal.      Nose: Nose normal.   Cardiovascular:      Rate and Rhythm: Normal rate and regular rhythm. Heart sounds: Normal heart sounds. No murmur heard. No friction rub. No gallop. Pulmonary:      Effort: Pulmonary effort is normal.      Breath sounds: Normal breath sounds. No wheezing or rales. Abdominal:      General: Bowel sounds are normal.      Palpations: Abdomen is soft. Tenderness: There is no abdominal tenderness. There is no guarding. Musculoskeletal:         General: Normal range of motion. Cervical back: Normal range of motion and neck supple. Lymphadenopathy:      Cervical: No cervical adenopathy. Skin:     General: Skin is warm. Neurological:      Mental Status: She is alert and oriented to person, place, and time. Deep Tendon Reflexes: Reflexes are normal and symmetric. Assessment:      Diagnosis Orders   1. Eosinophilic esophagitis  omeprazole (PRILOSEC) 40 MG delayed release capsule   2. Screening for lipid disorders  Lipid Panel   3.  Gastroesophageal reflux disease with esophagitis, unspecified whether hemorrhage  omeprazole (PRILOSEC) 40 MG delayed release capsule       Plan:      No follow-ups on file. Orders Placed This Encounter   Procedures    Lipid Panel     Standing Status:   Future     Standing Expiration Date:   5/11/2023     Order Specific Question:   Is Patient Fasting?/# of Hours     Answer:   12     Orders Placed This Encounter   Medications    omeprazole (PRILOSEC) 40 MG delayed release capsule     Sig: Take 1 capsule by mouth in the morning and at bedtime     Dispense:  180 capsule     Refill:  1    valACYclovir (VALTREX) 1 g tablet     Sig: Take 2 tablets by mouth 2 times daily for 1 day     Dispense:  20 tablet     Refill:  0    will place pt on high dose omeprazole. If helps then reduce tab to 1 a day in 30 days. If does not help call GI for dilatation         Patient given educational materials - seepatient instructions. Discussed use, benefit, and side effects of prescribed medications. All patient questions answered. Pt voiced understanding. Patient agreed withtreatment plan. Follow up as directed.      Electronically signed by GERALDINE Marie CNP on 5/11/2022 at 12:33 PM

## 2022-08-09 DIAGNOSIS — I10 ESSENTIAL HYPERTENSION: ICD-10-CM

## 2022-08-09 RX ORDER — LOSARTAN POTASSIUM 100 MG/1
TABLET ORAL
Qty: 30 TABLET | Refills: 0 | Status: SHIPPED | OUTPATIENT
Start: 2022-08-09

## 2022-08-09 RX ORDER — METOPROLOL SUCCINATE 25 MG/1
TABLET, EXTENDED RELEASE ORAL
Qty: 30 TABLET | Refills: 0 | Status: SHIPPED | OUTPATIENT
Start: 2022-08-09

## 2022-08-09 RX ORDER — HYDROCHLOROTHIAZIDE 25 MG/1
TABLET ORAL
Qty: 30 TABLET | Refills: 0 | Status: SHIPPED | OUTPATIENT
Start: 2022-08-09

## 2022-11-02 ENCOUNTER — TELEMEDICINE (OUTPATIENT)
Dept: FAMILY MEDICINE CLINIC | Age: 52
End: 2022-11-02
Payer: MEDICAID

## 2022-11-02 DIAGNOSIS — R05.1 ACUTE COUGH: Primary | ICD-10-CM

## 2022-11-02 LAB
INFLUENZA A ANTIBODY: POSITIVE
INFLUENZA B ANTIBODY: NEGATIVE
Lab: NORMAL
PERFORMING INSTRUMENT: NORMAL
QC PASS/FAIL: NORMAL
SARS-COV-2, POC: NORMAL

## 2022-11-02 PROCEDURE — 87426 SARSCOV CORONAVIRUS AG IA: CPT | Performed by: FAMILY MEDICINE

## 2022-11-02 PROCEDURE — 99213 OFFICE O/P EST LOW 20 MIN: CPT | Performed by: FAMILY MEDICINE

## 2022-11-02 PROCEDURE — 87804 INFLUENZA ASSAY W/OPTIC: CPT | Performed by: FAMILY MEDICINE

## 2022-11-02 RX ORDER — OSELTAMIVIR PHOSPHATE 75 MG/1
75 CAPSULE ORAL 2 TIMES DAILY
Qty: 10 CAPSULE | Refills: 0 | Status: SHIPPED | OUTPATIENT
Start: 2022-11-02 | End: 2022-11-07

## 2022-11-02 ASSESSMENT — ENCOUNTER SYMPTOMS
VOMITING: 0
COUGH: 1
NAUSEA: 0

## 2022-11-02 NOTE — PROGRESS NOTES
[address]   [address]  Phone:  [phone]       2022    TELEHEALTH EVALUATION -- Audio/Visual (During BXQHI-78 public health emergency)    HPI:    Mackenzie De Santiago (:  1970) has requested an audio/video evaluation for the following concern(s):  cough, congestion, fever    She's having a cough, sore throat, chills, sweating, body aches, and headaches since Monday. She's been in bed since Monday. Her son has been ill with similar symptoms. Review of Systems   Constitutional:  Positive for activity change, appetite change, chills, diaphoresis, fatigue and fever. HENT:  Positive for congestion. Respiratory:  Positive for cough. Gastrointestinal:  Negative for nausea and vomiting. Prior to Visit Medications    Medication Sig Taking? Authorizing Provider   losartan (COZAAR) 100 MG tablet TAKE ONE TABLET DAILY, BY MOUTH. GERALDINE Watts CNP   metoprolol succinate (TOPROL XL) 25 MG extended release tablet TAKE ONE TABLET DAILY, BY MOUTH. GERALDINE Watts CNP   hydroCHLOROthiazide (HYDRODIURIL) 25 MG tablet TAKE ONE TABLET DAILY, BY MOUTH. GERALDINE Watts CNP   omeprazole (PRILOSEC) 40 MG delayed release capsule Take 1 capsule by mouth in the morning and at bedtime  GERALDINE Watts CNP   naproxen (NAPROSYN) 500 MG tablet Take 1 tablet by mouth 2 times daily  Maryam Stern DO   methotrexate (RHEUMATREX) 2.5 MG chemo tablet Take 10 mg by mouth once a week  Historical Provider, MD   folic acid (FOLVITE) 1 MG tablet Take 1 mg by mouth daily  Historical Provider, MD   FLUoxetine (PROZAC) 20 MG capsule Take 20 mg by mouth daily 2 tabs daily  Historical Provider, MD       Social History     Tobacco Use    Smoking status: Never    Smokeless tobacco: Never   Vaping Use    Vaping Use: Never used   Substance Use Topics    Alcohol use: Not Currently     Alcohol/week: 0.0 standard drinks     Comment: social    Drug use:  No No Known Allergies,   Past Medical History:   Diagnosis Date    Abdominal pain, right upper quadrant 2020    Arthritis     RA-sees Dr Praneeth Rascon    Calculus of gallbladder 2020    Depression     Genetic testing 01/26/2021    [facility] 68 gene panel-one MUTYH gene mutation and therefore, 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 [] Abnormal   [x] Mouth/Throat: Mucous membranes are moist.     External Ears [] Normal  [] Abnormal-     Neck: [] No visualized mass     Pulmonary/Chest: [x] Respiratory effort normal.  [x] No visualized signs of difficulty breathing or respiratory distress        [] Abnormal-      Musculoskeletal:   [] Normal gait with no signs of ataxia         [] Normal range of motion of neck        [] Abnormal-       Neurological:        [] No Facial Asymmetry (Cranial nerve 7 motor function) (limited exam to video visit)          [] No gaze palsy        [] Abnormal-         Skin:        [] No significant exanthematous lesions or discoloration noted on facial skin         [] Abnormal-            Psychiatric:       [] Normal Affect [] No Hallucinations        [] Abnormal-       ASSESSMENT/PLAN:  1. Acute cough  - Will have her come in today for a nurse visit to be swabbed for COVID and influenza. Advised rest, increased hydration, and symptomatic treatment.  - POCT COVID-19, Antigen  - POCT Influenza A/B    Return if symptoms worsen or fail to improve.     Froylan Khan is a 47 y.o. female being evaluated by a Virtual Visit (video visit) encounter to address concerns as mentioned above. A caregiver was present when appropriate. Due to this being a TeleHealth encounter (During GCZMX-79 public health emergency), evaluation of the following organ systems was limited: Vitals/Constitutional/EENT/Resp/CV/GI//MS/Neuro/Skin/Heme-Lymph-Imm. Pursuant to the emergency declaration under the [address] [place] [address] and the [employer] Act, this Virtual Visit was conducted with patient's (and/or legal guardian's) consent, to reduce the patient's risk of exposure to COVID-19 and provide necessary medical care. The patient (and/or legal guardian) has also been advised to contact this office for worsening conditions or problems, and seek emergency medical treatment and/or call 911 if deemed necessary. Patient identification was verified at the start of the visit: Yes    Services were provided through a video synchronous discussion virtually to substitute for in-person clinic visit. Patient and provider were located at their individual homes. --Rubi Mercer MD on 11/2/2022 at 12:29 PM    An electronic signature was used to authenticate this note.

## 2022-11-28 ENCOUNTER — HOSPITAL ENCOUNTER (OUTPATIENT)
Dept: MAMMOGRAPHY | Age: 52
Discharge: HOME | End: 2022-11-28
Payer: MEDICAID

## 2022-11-28 DIAGNOSIS — Z12.39 SCREENING BREAST EXAMINATION: ICD-10-CM

## 2022-11-28 PROCEDURE — 77063 BREAST TOMOSYNTHESIS BI: CPT

## 2022-12-08 DIAGNOSIS — I10 ESSENTIAL HYPERTENSION: ICD-10-CM

## 2022-12-08 RX ORDER — METOPROLOL SUCCINATE 25 MG/1
TABLET, EXTENDED RELEASE ORAL
Qty: 30 TABLET | Refills: 5 | Status: SHIPPED | OUTPATIENT
Start: 2022-12-08

## 2022-12-08 RX ORDER — HYDROCHLOROTHIAZIDE 25 MG/1
TABLET ORAL
Qty: 30 TABLET | Refills: 5 | Status: SHIPPED | OUTPATIENT
Start: 2022-12-08

## 2022-12-08 RX ORDER — LOSARTAN POTASSIUM 100 MG/1
TABLET ORAL
Qty: 30 TABLET | Refills: 5 | Status: SHIPPED | OUTPATIENT
Start: 2022-12-08

## 2023-01-20 DIAGNOSIS — K21.00 GASTROESOPHAGEAL REFLUX DISEASE WITH ESOPHAGITIS, UNSPECIFIED WHETHER HEMORRHAGE: ICD-10-CM

## 2023-01-20 DIAGNOSIS — K20.0 EOSINOPHILIC ESOPHAGITIS: ICD-10-CM

## 2023-01-20 RX ORDER — OMEPRAZOLE 40 MG/1
CAPSULE, DELAYED RELEASE ORAL
Qty: 60 CAPSULE | Refills: 0 | Status: SHIPPED | OUTPATIENT
Start: 2023-01-20

## 2023-03-03 ENCOUNTER — TELEMEDICINE (OUTPATIENT)
Dept: FAMILY MEDICINE CLINIC | Age: 53
End: 2023-03-03
Payer: MEDICAID

## 2023-03-03 DIAGNOSIS — M06.9 RHEUMATOID ARTHRITIS, INVOLVING UNSPECIFIED SITE, UNSPECIFIED WHETHER RHEUMATOID FACTOR PRESENT (HCC): ICD-10-CM

## 2023-03-03 DIAGNOSIS — R53.83 OTHER FATIGUE: ICD-10-CM

## 2023-03-03 DIAGNOSIS — I10 ESSENTIAL HYPERTENSION: Primary | ICD-10-CM

## 2023-03-03 PROCEDURE — 99213 OFFICE O/P EST LOW 20 MIN: CPT | Performed by: NURSE PRACTITIONER

## 2023-03-03 NOTE — PROGRESS NOTES
3/3/2023    TELEHEALTH EVALUATION -- Audio/Visual (During VETUD-80 public health emergency)    HPI:    Edmond De Santiago (:  1970) has requested an audio/video evaluation for the following concern(s):    Pt had visit for a few different issues  first is pt has been losing hair, very tired. Ongoing for a couple months. Next is depending labs. Would like to discuss starting something for weight loss    Next issue pt with history of RA. Was seeing dr Soren Colon. She is now on medicaid. Dr Anderson Arm see her. She ran out of methotrexate and will soon be out of plaquenil. Ask if we will prescribe or how to proceed    Review of Systems   Constitutional:  Positive for appetite change and fatigue. HENT: Negative. Eyes: Negative. Respiratory: Negative. Cardiovascular: Negative. Gastrointestinal: Negative. Musculoskeletal: Negative. Skin: Negative. Neurological: Negative. Prior to Visit Medications    Medication Sig Taking? Authorizing Provider   omeprazole (PRILOSEC) 40 MG delayed release capsule TAKE 1 CAPSULE TWICE DAILY, IN THE MORNING & AT BEDTIME  GERALDINE Watts CNP   losartan (COZAAR) 100 MG tablet TAKE ONE TABLET DAILY, BY MOUTH. GERALDINE Watts CNP   hydroCHLOROthiazide (HYDRODIURIL) 25 MG tablet TAKE ONE TABLET DAILY, BY MOUTH. GERALDINE Watts CNP   metoprolol succinate (TOPROL XL) 25 MG extended release tablet TAKE ONE TABLET DAILY, BY MOUTH.   GERALDINE Watts CNP   naproxen (NAPROSYN) 500 MG tablet Take 1 tablet by mouth 2 times daily  Lul Tanner DO   folic acid (FOLVITE) 1 MG tablet Take 1 mg by mouth daily  Historical Provider, MD   FLUoxetine (PROZAC) 20 MG capsule Take 20 mg by mouth daily 2 tabs daily  Historical Provider, MD       Social History     Tobacco Use    Smoking status: Never    Smokeless tobacco: Never   Vaping Use    Vaping Use: Never used   Substance Use Topics    Alcohol use: Not Currently     Alcohol/week: 0.0 standard drinks     Comment: social    Drug use: No        No Known Allergies,   Past Medical History:   Diagnosis Date    Abdominal pain, right upper quadrant 2020    Arthritis     RA-sees Dr Montez Carranza    Calculus of gallbladder 2020    Depression     Genetic testing 01/26/2021    [facility] 68 gene panel-one MUTYH gene mutation and therefore, 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    GERD (gastroesophageal reflux disease)     Hypertension     Migraine    ,   Past Surgical History:   Procedure Laterality Date    CHOLECYSTECTOMY, LAPAROSCOPIC N/A 1/17/2020    ROBOT LAP ROSHAN, UMBILICAL HERNIA REPAIR performed by Luke Montanez MD at [place] [address] LEFT  9/21/2020    [facility] BREAST CYST ASPIRATION LEFT 9/21/2020 [address] Left 09/21/2020   ,   Social History     Tobacco Use    Smoking status: Never    Smokeless tobacco: Never   Vaping Use    Vaping Use: Never used   Substance Use Topics    Alcohol use: Not Currently     Alcohol/week: 0.0 standard drinks     Comment: social    Drug use: No   ,   Family History   Problem Relation Age of Onset    Cancer Mother 58        renal cancer    Depression Mother         SAD    High Blood Pressure Mother     Other Mother         gallbladder disease    Breast Cancer Mother 64        2nd time age 61, 2rd time age 71 (metastatic at this time)    Diabetes Father     Lung Cancer Father     Heart Disease Maternal Grandmother     High Blood Pressure Maternal Grandmother     Diabetes Maternal Grandfather     Heart Disease Maternal Grandfather     High Cholesterol Maternal Grandfather     No Known Problems Brother     Arthritis Paternal Grandmother         RA    Diabetes Paternal Grandfather     Hypertension Paternal Grandfather     Breast Cancer Maternal Aunt 54       PHYSICAL EXAMINATION:  [ INSTRUCTIONS:  \"[x]\" Indicates a positive item  \"[]\" Indicates a negative item  -- DELETE ALL ITEMS NOT EXAMINED]  Vital Signs: (As obtained by patient/caregiver or practitioner observation)    Blood pressure-  Heart rate-    Respiratory rate-    Temperature-  Pulse oximetry-     Constitutional: [] Appears well-developed and well-nourished [] No apparent distress      [] Abnormal-   Mental status  [] Alert and awake  [] Oriented to person/place/time []Able to follow commands      Eyes:  EOM    []  Normal  [] Abnormal-  Sclera  []  Normal  [] Abnormal -         Discharge []  None visible  [] Abnormal -    HENT:   [] Normocephalic, atraumatic. [] Abnormal   [] Mouth/Throat: Mucous membranes are moist.     External Ears [] Normal  [] Abnormal-     Neck: [] No visualized mass     Pulmonary/Chest: [] Respiratory effort normal.  [] No visualized signs of difficulty breathing or respiratory distress        [] Abnormal-      Musculoskeletal:   [] Normal gait with no signs of ataxia         [] Normal range of motion of neck        [] Abnormal-       Neurological:        [] No Facial Asymmetry (Cranial nerve 7 motor function) (limited exam to video visit)          [] No gaze palsy        [] Abnormal-         Skin:        [] No significant exanthematous lesions or discoloration noted on facial skin         [] Abnormal-            Psychiatric:       [] Normal Affect [] No Hallucinations        [] Abnormal-     Other pertinent observable physical exam findings-     ASSESSMENT/PLAN:   Diagnosis Orders   1. Essential hypertension  Comprehensive Metabolic Panel    CBC with Auto Differential    Lipid Panel    Hemoglobin A1C      2. Other fatigue  TSH with Reflex    Hemoglobin A1C      3. Rheumatoid arthritis, involving unspecified site, unspecified whether rheumatoid factor present [facility])  Susy Garcia MD, Rheumatology, [facility] DESTINY  CHELSEA IITravisVIERTTAMARA        See orders    No follow-ups on file. Amie De Santiago, was evaluated through a synchronous (real-time) audio-video encounter.  The patient (or guardian if applicable) is aware that this is a billable service, which includes applicable co-pays. This Virtual Visit was conducted with patient's (and/or legal guardian's) consent. The visit was conducted pursuant to the emergency declaration under the [facility], [address] [place] [address] authority and the "[employer] and [employer] Act. Patient identification was verified, and a caregiver was present when appropriate. The patient was located at Home: [address]  [address] [place] [address]  Provider was located at [facility] ([place] [address]): [address] [place] [address] [address]  [facility],  [facility]        Total time spent on this encounter: Not billed by time    --GERALDINE Cervantes CNP on 3/3/2023 at 12:53 PM    An electronic signature was used to authenticate this note.

## 2023-03-07 ENCOUNTER — NURSE ONLY (OUTPATIENT)
Dept: LAB | Age: 53
End: 2023-03-07

## 2023-03-07 DIAGNOSIS — I10 ESSENTIAL HYPERTENSION: ICD-10-CM

## 2023-03-07 DIAGNOSIS — R53.83 OTHER FATIGUE: ICD-10-CM

## 2023-03-07 LAB
ALBUMIN SERPL BCG-MCNC: 4.4 G/DL (ref 3.5–5.1)
ALP SERPL-CCNC: 64 U/L (ref 38–126)
ALT SERPL W/O P-5'-P-CCNC: 12 U/L (ref 11–66)
ANION GAP SERPL CALC-SCNC: 14 MEQ/L (ref 8–16)
AST SERPL-CCNC: 14 U/L (ref 5–40)
BASOPHILS ABSOLUTE: 0 THOU/MM3 (ref 0–0.1)
BASOPHILS NFR BLD AUTO: 0.7 %
BILIRUB SERPL-MCNC: 0.4 MG/DL (ref 0.3–1.2)
BUN SERPL-MCNC: 11 MG/DL (ref 7–22)
CALCIUM SERPL-MCNC: 9.2 MG/DL (ref 8.5–10.5)
CHLORIDE SERPL-SCNC: 102 MEQ/L (ref 98–111)
CHOLEST SERPL-MCNC: 245 MG/DL (ref 100–199)
CO2 SERPL-SCNC: 26 MEQ/L (ref 23–33)
CREAT SERPL-MCNC: 0.8 MG/DL (ref 0.4–1.2)
DEPRECATED MEAN GLUCOSE BLD GHB EST-ACNC: 96 MG/DL (ref 70–126)
DEPRECATED RDW RBC AUTO: 42.5 FL (ref 35–45)
EOSINOPHIL NFR BLD AUTO: 3.2 %
EOSINOPHILS ABSOLUTE: 0.2 THOU/MM3 (ref 0–0.4)
ERYTHROCYTE [DISTWIDTH] IN BLOOD BY AUTOMATED COUNT: 13 % (ref 11.5–14.5)
GFR SERPL CREATININE-BSD FRML MDRD: > 60 ML/MIN/1.73M2
GLUCOSE SERPL-MCNC: 125 MG/DL (ref 70–108)
HBA1C MFR BLD HPLC: 5.2 % (ref 4.4–6.4)
HCT VFR BLD AUTO: 40.7 % (ref 37–47)
HDLC SERPL-MCNC: 49 MG/DL
HGB BLD-MCNC: 13.7 GM/DL (ref 12–16)
IMM GRANULOCYTES # BLD AUTO: 0.01 THOU/MM3 (ref 0–0.07)
IMM GRANULOCYTES NFR BLD AUTO: 0.2 %
LDLC SERPL CALC-MCNC: 138 MG/DL
LYMPHOCYTES ABSOLUTE: 1.9 THOU/MM3 (ref 1–4.8)
LYMPHOCYTES NFR BLD AUTO: 34.6 %
MCH RBC QN AUTO: 30.4 PG (ref 26–33)
MCHC RBC AUTO-ENTMCNC: 33.7 GM/DL (ref 32.2–35.5)
MCV RBC AUTO: 90.2 FL (ref 81–99)
MONOCYTES ABSOLUTE: 0.4 THOU/MM3 (ref 0.4–1.3)
MONOCYTES NFR BLD AUTO: 6.5 %
NEUTROPHILS NFR BLD AUTO: 54.8 %
NRBC BLD AUTO-RTO: 0 /100 WBC
PLATELET # BLD AUTO: 315 THOU/MM3 (ref 130–400)
PMV BLD AUTO: 10 FL (ref 9.4–12.4)
POTASSIUM SERPL-SCNC: 3.3 MEQ/L (ref 3.5–5.2)
PROT SERPL-MCNC: 7 G/DL (ref 6.1–8)
RBC # BLD AUTO: 4.51 MILL/MM3 (ref 4.2–5.4)
SEGMENTED NEUTROPHILS ABSOLUTE COUNT: 3.1 THOU/MM3 (ref 1.8–7.7)
SODIUM SERPL-SCNC: 142 MEQ/L (ref 135–145)
TRIGL SERPL-MCNC: 288 MG/DL (ref 0–199)
TSH SERPL DL<=0.005 MIU/L-ACNC: 0.98 UIU/ML (ref 0.4–4.2)
WBC # BLD AUTO: 5.6 THOU/MM3 (ref 4.8–10.8)

## 2023-03-08 ENCOUNTER — TELEPHONE (OUTPATIENT)
Dept: FAMILY MEDICINE CLINIC | Age: 53
End: 2023-03-08

## 2023-03-08 DIAGNOSIS — R73.9 HYPERGLYCEMIA: Primary | ICD-10-CM

## 2023-03-08 DIAGNOSIS — E78.5 HYPERLIPIDEMIA, UNSPECIFIED HYPERLIPIDEMIA TYPE: ICD-10-CM

## 2023-03-08 RX ORDER — ATORVASTATIN CALCIUM 20 MG/1
20 TABLET, FILM COATED ORAL DAILY
Qty: 30 TABLET | Refills: 11 | Status: SHIPPED | OUTPATIENT
Start: 2023-03-08 | End: 2024-03-07

## 2023-03-08 NOTE — TELEPHONE ENCOUNTER
Please call pt. Labs showed high sugar and high cholesterol. For her sugar will start metformin. This should help with weight loss as well. Also focus on diet and exercise  For her cholesterol will start lipitor.   Schedule a follow up in 3months and will see how she is doing with sugar and weight

## 2023-03-30 ENCOUNTER — OFFICE VISIT (OUTPATIENT)
Dept: RHEUMATOLOGY | Age: 53
End: 2023-03-30
Payer: MEDICAID

## 2023-03-30 VITALS
HEART RATE: 79 BPM | BODY MASS INDEX: 36.65 KG/M2 | WEIGHT: 220 LBS | DIASTOLIC BLOOD PRESSURE: 62 MMHG | SYSTOLIC BLOOD PRESSURE: 118 MMHG | HEIGHT: 65 IN | OXYGEN SATURATION: 97 %

## 2023-03-30 DIAGNOSIS — M06.9 RHEUMATOID ARTHRITIS INVOLVING MULTIPLE SITES, UNSPECIFIED WHETHER RHEUMATOID FACTOR PRESENT (HCC): Primary | ICD-10-CM

## 2023-03-30 DIAGNOSIS — G89.29 CHRONIC PAIN OF BOTH KNEES: ICD-10-CM

## 2023-03-30 DIAGNOSIS — M25.562 CHRONIC PAIN OF BOTH KNEES: ICD-10-CM

## 2023-03-30 DIAGNOSIS — Z79.899 ENCOUNTER FOR LONG-TERM (CURRENT) USE OF HIGH-RISK MEDICATION: ICD-10-CM

## 2023-03-30 DIAGNOSIS — M25.561 CHRONIC PAIN OF BOTH KNEES: ICD-10-CM

## 2023-03-30 PROCEDURE — 99204 OFFICE O/P NEW MOD 45 MIN: CPT | Performed by: INTERNAL MEDICINE

## 2023-03-30 RX ORDER — LEFLUNOMIDE 20 MG/1
20 TABLET ORAL DAILY
Qty: 30 TABLET | Refills: 1 | Status: SHIPPED | OUTPATIENT
Start: 2023-03-30 | End: 2023-04-29

## 2023-03-30 RX ORDER — HYDROXYCHLOROQUINE SULFATE 200 MG/1
200 TABLET, FILM COATED ORAL 2 TIMES DAILY
Qty: 60 TABLET | Refills: 5 | Status: SHIPPED | OUTPATIENT
Start: 2023-03-30 | End: 2023-04-29

## 2023-03-30 ASSESSMENT — ENCOUNTER SYMPTOMS
VOMITING: 0
SHORTNESS OF BREATH: 0
BLOOD IN STOOL: 0
COUGH: 0
NAUSEA: 0
ABDOMINAL PAIN: 0

## 2023-03-30 ASSESSMENT — JOINT PAIN
TOTAL NUMBER OF TENDER JOINTS: 3
TOTAL NUMBER OF SWOLLEN JOINTS: 2

## 2023-03-30 NOTE — PROGRESS NOTES
Standing Expiration Date:   3/30/2024    C-Reactive Protein     Standing Status:   Future     Standing Expiration Date:   3/30/2024    Sedimentation Rate     Standing Status:   Future     Standing Expiration Date:   3/30/2024    Rheumatoid Factor     Standing Status:   Future     Standing Expiration Date:   8/61/9833    Cyclic Citrul Peptide Antibody, IgG     Standing Status:   Future     Standing Expiration Date:   3/30/2024    KEYANA Screen with Reflex     Standing Status:   Future     Standing Expiration Date:   3/30/2024        Romelia Phillip MD    [address] [place] [address]  [address] [facility]. [address]  [phone]

## 2023-05-31 ENCOUNTER — TELEPHONE (OUTPATIENT)
Dept: FAMILY MEDICINE CLINIC | Age: 53
End: 2023-05-31

## 2023-05-31 DIAGNOSIS — J06.9 URI, ACUTE: ICD-10-CM

## 2023-05-31 RX ORDER — AZITHROMYCIN 250 MG/1
TABLET, FILM COATED ORAL
Qty: 1 PACKET | Refills: 0 | Status: SHIPPED | OUTPATIENT
Start: 2023-05-31 | End: 2023-06-10

## 2023-06-09 ENCOUNTER — OFFICE VISIT (OUTPATIENT)
Dept: FAMILY MEDICINE CLINIC | Age: 53
End: 2023-06-09
Payer: MEDICAID

## 2023-06-09 VITALS
HEIGHT: 65 IN | SYSTOLIC BLOOD PRESSURE: 124 MMHG | OXYGEN SATURATION: 96 % | RESPIRATION RATE: 16 BRPM | BODY MASS INDEX: 38.82 KG/M2 | WEIGHT: 233 LBS | HEART RATE: 75 BPM | DIASTOLIC BLOOD PRESSURE: 88 MMHG | TEMPERATURE: 97.4 F

## 2023-06-09 DIAGNOSIS — M06.9 RHEUMATOID ARTHRITIS, INVOLVING UNSPECIFIED SITE, UNSPECIFIED WHETHER RHEUMATOID FACTOR PRESENT (HCC): ICD-10-CM

## 2023-06-09 DIAGNOSIS — I10 ESSENTIAL HYPERTENSION: ICD-10-CM

## 2023-06-09 DIAGNOSIS — E11.9 TYPE 2 DIABETES MELLITUS WITHOUT COMPLICATION, WITHOUT LONG-TERM CURRENT USE OF INSULIN (HCC): ICD-10-CM

## 2023-06-09 DIAGNOSIS — E78.5 HYPERLIPIDEMIA, UNSPECIFIED HYPERLIPIDEMIA TYPE: Primary | ICD-10-CM

## 2023-06-09 PROCEDURE — 3079F DIAST BP 80-89 MM HG: CPT | Performed by: NURSE PRACTITIONER

## 2023-06-09 PROCEDURE — 3044F HG A1C LEVEL LT 7.0%: CPT | Performed by: NURSE PRACTITIONER

## 2023-06-09 PROCEDURE — 3074F SYST BP LT 130 MM HG: CPT | Performed by: NURSE PRACTITIONER

## 2023-06-09 PROCEDURE — 99214 OFFICE O/P EST MOD 30 MIN: CPT | Performed by: NURSE PRACTITIONER

## 2023-06-09 RX ORDER — SEMAGLUTIDE 1.34 MG/ML
0.25 INJECTION, SOLUTION SUBCUTANEOUS WEEKLY
Qty: 4 ADJUSTABLE DOSE PRE-FILLED PEN SYRINGE | Refills: 3 | Status: SHIPPED | OUTPATIENT
Start: 2023-06-09

## 2023-06-09 SDOH — ECONOMIC STABILITY: HOUSING INSECURITY
IN THE LAST 12 MONTHS, WAS THERE A TIME WHEN YOU DID NOT HAVE A STEADY PLACE TO SLEEP OR SLEPT IN A SHELTER (INCLUDING NOW)?: NO

## 2023-06-09 SDOH — ECONOMIC STABILITY: INCOME INSECURITY: HOW HARD IS IT FOR YOU TO PAY FOR THE VERY BASICS LIKE FOOD, HOUSING, MEDICAL CARE, AND HEATING?: NOT VERY HARD

## 2023-06-09 SDOH — ECONOMIC STABILITY: FOOD INSECURITY: WITHIN THE PAST 12 MONTHS, THE FOOD YOU BOUGHT JUST DIDN'T LAST AND YOU DIDN'T HAVE MONEY TO GET MORE.: NEVER TRUE

## 2023-06-09 SDOH — ECONOMIC STABILITY: FOOD INSECURITY: WITHIN THE PAST 12 MONTHS, YOU WORRIED THAT YOUR FOOD WOULD RUN OUT BEFORE YOU GOT MONEY TO BUY MORE.: NEVER TRUE

## 2023-06-09 ASSESSMENT — ENCOUNTER SYMPTOMS
RESPIRATORY NEGATIVE: 1
GASTROINTESTINAL NEGATIVE: 1
EYES NEGATIVE: 1

## 2023-06-09 NOTE — PROGRESS NOTES
History     Tobacco Use    Smoking status: Never    Smokeless tobacco: Never   Substance Use Topics    Alcohol use: Not Currently     Alcohol/week: 0.0 standard drinks     Comment: social      Current Outpatient Medications   Medication Sig Dispense Refill    Semaglutide,0.25 or 0.5MG/DOS, (OZEMPIC, 0.25 OR 0.5 MG/DOSE,) 2 MG/1.5ML SOPN Inject 0.25 mg into the skin once a week 4 Adjustable Dose Pre-filled Pen Syringe 3    leflunomide (ARAVA) 20 MG tablet Take 1 tablet by mouth daily 30 tablet 1    hydroxychloroquine (PLAQUENIL) 200 MG tablet Take 1 tablet by mouth 2 times daily 60 tablet 5    metFORMIN (GLUCOPHAGE) 500 MG tablet Take 1 tablet by mouth 2 times daily (with meals) 60 tablet 5    atorvastatin (LIPITOR) 20 MG tablet Take 1 tablet by mouth daily 30 tablet 11    omeprazole (PRILOSEC) 40 MG delayed release capsule TAKE 1 CAPSULE TWICE DAILY, IN THE MORNING & AT BEDTIME 60 capsule 0    losartan (COZAAR) 100 MG tablet TAKE ONE TABLET DAILY, BY MOUTH. 30 tablet 5    hydroCHLOROthiazide (HYDRODIURIL) 25 MG tablet TAKE ONE TABLET DAILY, BY MOUTH. 30 tablet 5    metoprolol succinate (TOPROL XL) 25 MG extended release tablet TAKE ONE TABLET DAILY, BY MOUTH. 30 tablet 5    naproxen (NAPROSYN) 500 MG tablet Take 1 tablet by mouth 2 times daily 20 tablet 0    folic acid (FOLVITE) 1 MG tablet Take 1 tablet by mouth daily      FLUoxetine (PROZAC) 20 MG capsule Take 1 capsule by mouth daily 2 tabs daily       No current facility-administered medications for this visit.      No Known Allergies  Health Maintenance   Topic Date Due    HIV screen  Never done    COVID-19 Vaccine (1) 06/03/2024 (Originally 1970)    DTaP/Tdap/Td vaccine (1 - Tdap) 06/09/2024 (Originally 5/1/1990)    Shingles vaccine (1 of 2) 06/09/2024 (Originally 5/1/2021)    Flu vaccine (Season Ended) 08/01/2023    Breast cancer screen  11/28/2023    Depression Screen  03/03/2024    Lipids  03/07/2024    Cervical cancer screen  06/25/2024    Diabetes

## 2023-06-12 DIAGNOSIS — K21.00 GASTROESOPHAGEAL REFLUX DISEASE WITH ESOPHAGITIS, UNSPECIFIED WHETHER HEMORRHAGE: ICD-10-CM

## 2023-06-12 DIAGNOSIS — K20.0 EOSINOPHILIC ESOPHAGITIS: ICD-10-CM

## 2023-06-12 RX ORDER — OMEPRAZOLE 40 MG/1
CAPSULE, DELAYED RELEASE ORAL
Qty: 60 CAPSULE | Refills: 5 | Status: SHIPPED | OUTPATIENT
Start: 2023-06-12

## 2023-06-19 ENCOUNTER — TELEPHONE (OUTPATIENT)
Dept: FAMILY MEDICINE CLINIC | Age: 53
End: 2023-06-19

## 2023-06-19 DIAGNOSIS — K20.0 EOSINOPHILIC ESOPHAGITIS: ICD-10-CM

## 2023-06-19 DIAGNOSIS — E11.9 TYPE 2 DIABETES MELLITUS WITHOUT COMPLICATION, WITHOUT LONG-TERM CURRENT USE OF INSULIN (HCC): Primary | ICD-10-CM

## 2023-06-19 NOTE — TELEPHONE ENCOUNTER
Patient called regarding ozempic PA. PA was denied. Patient needs to have a history of at leaset 120 days of therapy with THREE preferred medications. ONE of the 120 day trials must be Byetta 5mcg and 10mcg, Victoza 01HG/3SF pen or Trulicity 4.43TH, 6.2NZ 3mg and 4.5mg    Farxiga 5mg and 10mg, invokana 100mg and 300mg and jardiance 10 and 25mg. Patient also asking for a new referral to GI to have her throat stretched. Patient states procedure was cancelled last year and due to other issues she did not reschedule. Patient asking what else she can take besides the omeprazole BID to help with symptoms. Patient states feels like something is stuck in her throat.

## 2023-06-19 NOTE — TELEPHONE ENCOUNTER
Pina called in. With her throat. She is on maximum dose of omeprazole.   Likely the only thing that will help will be to have her throat stretched

## 2023-06-29 ENCOUNTER — NURSE ONLY (OUTPATIENT)
Dept: LAB | Age: 53
End: 2023-06-29

## 2023-06-29 ENCOUNTER — OFFICE VISIT (OUTPATIENT)
Dept: RHEUMATOLOGY | Age: 53
End: 2023-06-29
Payer: MEDICAID

## 2023-06-29 VITALS
WEIGHT: 233 LBS | DIASTOLIC BLOOD PRESSURE: 82 MMHG | HEIGHT: 65 IN | SYSTOLIC BLOOD PRESSURE: 128 MMHG | BODY MASS INDEX: 38.82 KG/M2 | HEART RATE: 92 BPM | OXYGEN SATURATION: 97 %

## 2023-06-29 DIAGNOSIS — Z79.899 ENCOUNTER FOR LONG-TERM (CURRENT) USE OF HIGH-RISK MEDICATION: ICD-10-CM

## 2023-06-29 DIAGNOSIS — M06.9 RHEUMATOID ARTHRITIS INVOLVING MULTIPLE SITES, UNSPECIFIED WHETHER RHEUMATOID FACTOR PRESENT (HCC): ICD-10-CM

## 2023-06-29 DIAGNOSIS — E87.6 HYPOKALEMIA: Primary | ICD-10-CM

## 2023-06-29 DIAGNOSIS — M06.9 RHEUMATOID ARTHRITIS INVOLVING MULTIPLE SITES, UNSPECIFIED WHETHER RHEUMATOID FACTOR PRESENT (HCC): Primary | ICD-10-CM

## 2023-06-29 LAB
ALBUMIN SERPL BCG-MCNC: 4.4 G/DL (ref 3.5–5.1)
ALP SERPL-CCNC: 75 U/L (ref 38–126)
ALT SERPL W/O P-5'-P-CCNC: 29 U/L (ref 11–66)
ANION GAP SERPL CALC-SCNC: 16 MEQ/L (ref 8–16)
AST SERPL-CCNC: 23 U/L (ref 5–40)
BASOPHILS ABSOLUTE: 0 THOU/MM3 (ref 0–0.1)
BASOPHILS NFR BLD AUTO: 0.5 %
BILIRUB SERPL-MCNC: 0.3 MG/DL (ref 0.3–1.2)
BUN SERPL-MCNC: 6 MG/DL (ref 7–22)
CALCIUM SERPL-MCNC: 9.2 MG/DL (ref 8.5–10.5)
CHLORIDE SERPL-SCNC: 99 MEQ/L (ref 98–111)
CO2 SERPL-SCNC: 26 MEQ/L (ref 23–33)
CREAT SERPL-MCNC: 0.8 MG/DL (ref 0.4–1.2)
CRP SERPL-MCNC: 0.32 MG/DL (ref 0–1)
DEPRECATED RDW RBC AUTO: 40.6 FL (ref 35–45)
EOSINOPHIL NFR BLD AUTO: 1.3 %
EOSINOPHILS ABSOLUTE: 0.1 THOU/MM3 (ref 0–0.4)
ERYTHROCYTE [DISTWIDTH] IN BLOOD BY AUTOMATED COUNT: 12.7 % (ref 11.5–14.5)
ERYTHROCYTE [SEDIMENTATION RATE] IN BLOOD BY WESTERGREN METHOD: 18 MM/HR (ref 0–20)
GFR SERPL CREATININE-BSD FRML MDRD: > 60 ML/MIN/1.73M2
GLUCOSE SERPL-MCNC: 97 MG/DL (ref 70–108)
HCT VFR BLD AUTO: 44.9 % (ref 37–47)
HGB BLD-MCNC: 14.9 GM/DL (ref 12–16)
IMM GRANULOCYTES # BLD AUTO: 0.01 THOU/MM3 (ref 0–0.07)
IMM GRANULOCYTES NFR BLD AUTO: 0.2 %
LYMPHOCYTES ABSOLUTE: 2 THOU/MM3 (ref 1–4.8)
LYMPHOCYTES NFR BLD AUTO: 37.2 %
MCH RBC QN AUTO: 29 PG (ref 26–33)
MCHC RBC AUTO-ENTMCNC: 33.2 GM/DL (ref 32.2–35.5)
MCV RBC AUTO: 87.4 FL (ref 81–99)
MONOCYTES ABSOLUTE: 0.6 THOU/MM3 (ref 0.4–1.3)
MONOCYTES NFR BLD AUTO: 10.3 %
NEUTROPHILS NFR BLD AUTO: 50.5 %
NRBC BLD AUTO-RTO: 0 /100 WBC
PLATELET # BLD AUTO: 369 THOU/MM3 (ref 130–400)
PMV BLD AUTO: 10.1 FL (ref 9.4–12.4)
POTASSIUM SERPL-SCNC: 2.8 MEQ/L (ref 3.5–5.2)
PROT SERPL-MCNC: 7.5 G/DL (ref 6.1–8)
RBC # BLD AUTO: 5.14 MILL/MM3 (ref 4.2–5.4)
SEGMENTED NEUTROPHILS ABSOLUTE COUNT: 2.8 THOU/MM3 (ref 1.8–7.7)
SODIUM SERPL-SCNC: 141 MEQ/L (ref 135–145)
WBC # BLD AUTO: 5.5 THOU/MM3 (ref 4.8–10.8)

## 2023-06-29 PROCEDURE — 99214 OFFICE O/P EST MOD 30 MIN: CPT | Performed by: INTERNAL MEDICINE

## 2023-06-29 RX ORDER — LEFLUNOMIDE 20 MG/1
20 TABLET ORAL DAILY
Qty: 30 TABLET | Refills: 3 | Status: SHIPPED | OUTPATIENT
Start: 2023-06-29

## 2023-06-29 RX ORDER — HYDROXYCHLOROQUINE SULFATE 200 MG/1
200 TABLET, FILM COATED ORAL 2 TIMES DAILY
Qty: 60 TABLET | Refills: 5 | Status: SHIPPED | OUTPATIENT
Start: 2023-06-29

## 2023-06-29 RX ORDER — POTASSIUM CHLORIDE 20 MEQ/1
20 TABLET, EXTENDED RELEASE ORAL DAILY
Qty: 30 TABLET | Refills: 0 | Status: SHIPPED | OUTPATIENT
Start: 2023-06-29

## 2023-06-29 ASSESSMENT — ENCOUNTER SYMPTOMS
SHORTNESS OF BREATH: 0
COUGH: 0
NAUSEA: 0
ABDOMINAL PAIN: 0
VOMITING: 0

## 2023-06-29 ASSESSMENT — JOINT PAIN
TOTAL NUMBER OF SWOLLEN JOINTS: 0
TOTAL NUMBER OF TENDER JOINTS: 0

## 2023-07-05 ENCOUNTER — TELEPHONE (OUTPATIENT)
Dept: RHEUMATOLOGY | Age: 53
End: 2023-07-05

## 2023-07-05 NOTE — TELEPHONE ENCOUNTER
Attempted to contact pt, no answer. Left message on voicemail regarding labs.  For the pt to call the office to discuss

## 2023-07-05 NOTE — TELEPHONE ENCOUNTER
The following was sent to pt through [employer] 5 days ago, but was not seen by pt. \"Your potassium level is low, lower than usual. This could be related to the hydrochlorothiazide treatment. I've sent a prescription of potassium supplment to your pharmacy. I'd like to repeat your potassium level in 3 weeks. Lab order is in your chart. \"      Please notify pt with message. Thank you.

## 2023-07-25 ENCOUNTER — PATIENT MESSAGE (OUTPATIENT)
Dept: FAMILY MEDICINE CLINIC | Age: 53
End: 2023-07-25

## 2023-07-25 RX ORDER — GENTAMICIN SULFATE 3 MG/ML
1 SOLUTION/ DROPS OPHTHALMIC 4 TIMES DAILY
Qty: 1 EACH | Refills: 0 | Status: SHIPPED | OUTPATIENT
Start: 2023-07-25 | End: 2023-07-27

## 2023-07-27 RX ORDER — MOXIFLOXACIN 5 MG/ML
1 SOLUTION/ DROPS OPHTHALMIC 3 TIMES DAILY
Qty: 1 EACH | Refills: 0 | Status: SHIPPED | OUTPATIENT
Start: 2023-07-27 | End: 2023-08-03

## 2023-07-27 RX ORDER — CEPHALEXIN 500 MG/1
500 CAPSULE ORAL 3 TIMES DAILY
Qty: 30 CAPSULE | Refills: 0 | Status: SHIPPED | OUTPATIENT
Start: 2023-07-27 | End: 2023-08-06

## 2023-09-01 DIAGNOSIS — I10 ESSENTIAL HYPERTENSION: ICD-10-CM

## 2023-09-05 RX ORDER — LOSARTAN POTASSIUM 100 MG/1
TABLET ORAL
Qty: 90 TABLET | Refills: 1 | Status: SHIPPED | OUTPATIENT
Start: 2023-09-05

## 2023-09-05 RX ORDER — METOPROLOL SUCCINATE 25 MG/1
TABLET, EXTENDED RELEASE ORAL
Qty: 90 TABLET | Refills: 1 | Status: SHIPPED | OUTPATIENT
Start: 2023-09-05

## 2023-09-05 RX ORDER — HYDROCHLOROTHIAZIDE 25 MG/1
TABLET ORAL
Qty: 90 TABLET | Refills: 1 | Status: SHIPPED | OUTPATIENT
Start: 2023-09-05

## 2023-10-02 ENCOUNTER — NURSE ONLY (OUTPATIENT)
Dept: LAB | Age: 53
End: 2023-10-02

## 2023-10-02 DIAGNOSIS — Z79.899 ENCOUNTER FOR LONG-TERM (CURRENT) USE OF HIGH-RISK MEDICATION: ICD-10-CM

## 2023-10-02 DIAGNOSIS — E87.6 HYPOKALEMIA: ICD-10-CM

## 2023-10-02 DIAGNOSIS — M06.9 RHEUMATOID ARTHRITIS INVOLVING MULTIPLE SITES, UNSPECIFIED WHETHER RHEUMATOID FACTOR PRESENT (HCC): ICD-10-CM

## 2023-10-02 LAB
ALBUMIN SERPL BCG-MCNC: 4.1 G/DL (ref 3.5–5.1)
ALP SERPL-CCNC: 77 U/L (ref 38–126)
ALT SERPL W/O P-5'-P-CCNC: 15 U/L (ref 11–66)
ANION GAP SERPL CALC-SCNC: 12 MEQ/L (ref 8–16)
AST SERPL-CCNC: 13 U/L (ref 5–40)
BASOPHILS ABSOLUTE: 0 THOU/MM3 (ref 0–0.1)
BASOPHILS NFR BLD AUTO: 0.6 %
BILIRUB SERPL-MCNC: 0.3 MG/DL (ref 0.3–1.2)
BUN SERPL-MCNC: 13 MG/DL (ref 7–22)
CALCIUM SERPL-MCNC: 9.2 MG/DL (ref 8.5–10.5)
CHLORIDE SERPL-SCNC: 100 MEQ/L (ref 98–111)
CO2 SERPL-SCNC: 31 MEQ/L (ref 23–33)
CREAT SERPL-MCNC: 0.8 MG/DL (ref 0.4–1.2)
CRP SERPL-MCNC: 0.35 MG/DL (ref 0–1)
DEPRECATED RDW RBC AUTO: 42 FL (ref 35–45)
EOSINOPHIL NFR BLD AUTO: 2.3 %
EOSINOPHILS ABSOLUTE: 0.2 THOU/MM3 (ref 0–0.4)
ERYTHROCYTE [DISTWIDTH] IN BLOOD BY AUTOMATED COUNT: 13.2 % (ref 11.5–14.5)
GFR SERPL CREATININE-BSD FRML MDRD: > 60 ML/MIN/1.73M2
GLUCOSE SERPL-MCNC: 108 MG/DL (ref 70–108)
HCT VFR BLD AUTO: 40.4 % (ref 37–47)
HGB BLD-MCNC: 13.5 GM/DL (ref 12–16)
IMM GRANULOCYTES # BLD AUTO: 0.01 THOU/MM3 (ref 0–0.07)
IMM GRANULOCYTES NFR BLD AUTO: 0.2 %
LYMPHOCYTES ABSOLUTE: 1.8 THOU/MM3 (ref 1–4.8)
LYMPHOCYTES NFR BLD AUTO: 26.5 %
MAGNESIUM SERPL-MCNC: 1.4 MG/DL (ref 1.6–2.4)
MCH RBC QN AUTO: 29.2 PG (ref 26–33)
MCHC RBC AUTO-ENTMCNC: 33.4 GM/DL (ref 32.2–35.5)
MCV RBC AUTO: 87.4 FL (ref 81–99)
MONOCYTES ABSOLUTE: 0.5 THOU/MM3 (ref 0.4–1.3)
MONOCYTES NFR BLD AUTO: 7.7 %
NEUTROPHILS NFR BLD AUTO: 62.7 %
NRBC BLD AUTO-RTO: 0 /100 WBC
PLATELET # BLD AUTO: 305 THOU/MM3 (ref 130–400)
PMV BLD AUTO: 10.4 FL (ref 9.4–12.4)
POTASSIUM SERPL-SCNC: 2.8 MEQ/L (ref 3.5–5.2)
PROT SERPL-MCNC: 6.9 G/DL (ref 6.1–8)
RBC # BLD AUTO: 4.62 MILL/MM3 (ref 4.2–5.4)
SEGMENTED NEUTROPHILS ABSOLUTE COUNT: 4.2 THOU/MM3 (ref 1.8–7.7)
SODIUM SERPL-SCNC: 143 MEQ/L (ref 135–145)
WBC # BLD AUTO: 6.7 THOU/MM3 (ref 4.8–10.8)

## 2023-10-03 LAB — ERYTHROCYTE [SEDIMENTATION RATE] IN BLOOD BY WESTERGREN METHOD: 21 MM/HR (ref 0–20)

## 2023-10-05 ENCOUNTER — OFFICE VISIT (OUTPATIENT)
Dept: RHEUMATOLOGY | Age: 53
End: 2023-10-05
Payer: MEDICAID

## 2023-10-05 VITALS
DIASTOLIC BLOOD PRESSURE: 78 MMHG | HEART RATE: 94 BPM | BODY MASS INDEX: 36.99 KG/M2 | HEIGHT: 65 IN | OXYGEN SATURATION: 95 % | SYSTOLIC BLOOD PRESSURE: 116 MMHG | WEIGHT: 222 LBS

## 2023-10-05 DIAGNOSIS — Z79.899 ENCOUNTER FOR LONG-TERM (CURRENT) USE OF HIGH-RISK MEDICATION: ICD-10-CM

## 2023-10-05 DIAGNOSIS — E87.6 HYPOKALEMIA: Primary | ICD-10-CM

## 2023-10-05 DIAGNOSIS — M06.9 RHEUMATOID ARTHRITIS INVOLVING MULTIPLE SITES, UNSPECIFIED WHETHER RHEUMATOID FACTOR PRESENT (HCC): ICD-10-CM

## 2023-10-05 DIAGNOSIS — E83.42 HYPOMAGNESEMIA: ICD-10-CM

## 2023-10-05 PROCEDURE — 99214 OFFICE O/P EST MOD 30 MIN: CPT | Performed by: INTERNAL MEDICINE

## 2023-10-05 RX ORDER — LEFLUNOMIDE 20 MG/1
20 TABLET ORAL DAILY
Qty: 30 TABLET | Refills: 3 | Status: SHIPPED | OUTPATIENT
Start: 2023-10-05

## 2023-10-05 RX ORDER — MAGNESIUM OXIDE 400 MG/1
400 TABLET ORAL DAILY
Qty: 30 TABLET | Refills: 1 | Status: SHIPPED | OUTPATIENT
Start: 2023-10-05

## 2023-10-05 RX ORDER — POTASSIUM CHLORIDE 20 MEQ/1
20 TABLET, EXTENDED RELEASE ORAL 2 TIMES DAILY
Qty: 60 TABLET | Refills: 5 | Status: SHIPPED | OUTPATIENT
Start: 2023-10-05

## 2023-10-05 ASSESSMENT — ENCOUNTER SYMPTOMS
COUGH: 0
VOMITING: 0
NAUSEA: 0
ABDOMINAL PAIN: 0
SHORTNESS OF BREATH: 0

## 2023-10-05 ASSESSMENT — JOINT PAIN
TOTAL NUMBER OF SWOLLEN JOINTS: 4
TOTAL NUMBER OF TENDER JOINTS: 0

## 2023-10-05 NOTE — PROGRESS NOTES
[facility]) Physicians   Rheumatology Clinic Note      10/5/2023       CHIEF COMPLAINT:    Chief Complaint   Patient presents with    Follow-up     3 month follow up           HISTORY OF PRESENT ILLNESS:    47 y.o. female with seronegative rheumatoid arthritis presents for follow up. Presently, she is on hydroxychloroquine 200 mg bid and leflunomide 20 mg daily. Past med: methotrexate (ineffective)    Overall doing well. Has 3-4/10 tolerable pain in her fingers and knees. Morning stiffness lasts for an hour, but overall improvement with current medications. No significant joint swelling. Has chronic diarrhea since her gall bladder surgery. However, believes that the diarrhea might have worsen since starting leflunomide. Past Medical History:     has a past medical history of Abdominal pain, right upper quadrant, Arthritis, Calculus of gallbladder, Depression, Genetic testing, GERD (gastroesophageal reflux disease), High cholesterol, Hypertension, and Migraine. Past Surgical History:     has a past surgical history that includes Cholecystectomy, laparoscopic (N/A, 1/17/2020); [facility] US BREAST CYST ASPIRATION LEFT (9/21/2020); and US Breast Fine Needle Aspiration (Left, 09/21/2020).     Current Medications:      Current Outpatient Medications:     potassium chloride (KLOR-CON M) 20 MEQ extended release tablet, Take 1 tablet by mouth 2 times daily, Disp: 60 tablet, Rfl: 5    magnesium oxide (MAG-OX) 400 MG tablet, Take 1 tablet by mouth daily, Disp: 30 tablet, Rfl: 1    leflunomide (ARAVA) 20 MG tablet, Take 1 tablet by mouth daily, Disp: 30 tablet, Rfl: 3    losartan (COZAAR) 100 MG tablet, TAKE ONE TABLET DAILY, BY MOUTH., Disp: 90 tablet, Rfl: 1    hydroCHLOROthiazide (HYDRODIURIL) 25 MG tablet, TAKE ONE TABLET DAILY, BY MOUTH., Disp: 90 tablet, Rfl: 1    metoprolol succinate (TOPROL XL) 25 MG extended release tablet, TAKE ONE TABLET DAILY, BY MOUTH., Disp: 90 tablet, Rfl: 1    hydroxychloroquine

## 2023-10-30 DIAGNOSIS — E83.42 HYPOMAGNESEMIA: ICD-10-CM

## 2023-10-30 DIAGNOSIS — E11.9 TYPE 2 DIABETES MELLITUS WITHOUT COMPLICATION, WITHOUT LONG-TERM CURRENT USE OF INSULIN (HCC): ICD-10-CM

## 2023-10-30 RX ORDER — MAGNESIUM OXIDE 400 MG/1
TABLET ORAL
Qty: 30 TABLET | Refills: 1 | OUTPATIENT
Start: 2023-10-30

## 2023-10-30 RX ORDER — LIRAGLUTIDE 6 MG/ML
INJECTION SUBCUTANEOUS
Qty: 6 ML | Refills: 3 | Status: SHIPPED | OUTPATIENT
Start: 2023-10-30

## 2023-10-30 NOTE — TELEPHONE ENCOUNTER
Medicatin garcia been requested to soon. Pt should still have one refill available.  Pt has been notified and she will be reaching out to her pharmacy

## 2024-01-11 ENCOUNTER — OFFICE VISIT (OUTPATIENT)
Dept: RHEUMATOLOGY | Age: 54
End: 2024-01-11
Payer: MEDICAID

## 2024-01-11 VITALS
DIASTOLIC BLOOD PRESSURE: 76 MMHG | WEIGHT: 220 LBS | HEIGHT: 65 IN | OXYGEN SATURATION: 97 % | SYSTOLIC BLOOD PRESSURE: 132 MMHG | HEART RATE: 80 BPM | BODY MASS INDEX: 36.65 KG/M2

## 2024-01-11 DIAGNOSIS — Z79.899 ENCOUNTER FOR LONG-TERM (CURRENT) USE OF HIGH-RISK MEDICATION: ICD-10-CM

## 2024-01-11 DIAGNOSIS — M06.9 RHEUMATOID ARTHRITIS INVOLVING MULTIPLE SITES, UNSPECIFIED WHETHER RHEUMATOID FACTOR PRESENT (HCC): Primary | ICD-10-CM

## 2024-01-11 PROCEDURE — 99214 OFFICE O/P EST MOD 30 MIN: CPT | Performed by: INTERNAL MEDICINE

## 2024-01-11 RX ORDER — LEFLUNOMIDE 20 MG/1
20 TABLET ORAL DAILY
Qty: 30 TABLET | Refills: 0 | Status: SHIPPED | OUTPATIENT
Start: 2024-01-11

## 2024-01-11 RX ORDER — HYDROXYCHLOROQUINE SULFATE 200 MG/1
200 TABLET, FILM COATED ORAL 2 TIMES DAILY
Qty: 60 TABLET | Refills: 5 | Status: SHIPPED | OUTPATIENT
Start: 2024-01-11

## 2024-01-11 ASSESSMENT — JOINT PAIN
TOTAL NUMBER OF SWOLLEN JOINTS: 0
TOTAL NUMBER OF TENDER JOINTS: 0

## 2024-01-11 ASSESSMENT — ENCOUNTER SYMPTOMS
ABDOMINAL PAIN: 0
COUGH: 0
VOMITING: 0
SHORTNESS OF BREATH: 0
NAUSEA: 0

## 2024-01-11 NOTE — PROGRESS NOTES
[facility] Physicians   Rheumatology Clinic Note      1/11/2024       CHIEF COMPLAINT:    Chief Complaint   Patient presents with    3 Month Follow-Up     Hypokalemia    Other     Pt is doing well. No pain           HISTORY OF PRESENT ILLNESS:    53 y.o. female with seronegative rheumatoid arthritis presents for follow up. Presently, she is on hydroxychloroquine 200 mg bid and leflunomide 20 mg daily.    Past med: methotrexate (ineffective)    Overall doing well. No major flare ups since seen last.  Was not able to get labs a week ago due to having flu-like symptoms. This has resolved at this time.      Past Medical History:     has a past medical history of Abdominal pain, right upper quadrant, Arthritis, Calculus of gallbladder, Depression, Genetic testing, GERD (gastroesophageal reflux disease), High cholesterol, Hypertension, and Migraine.    Past Surgical History:     has a past surgical history that includes Cholecystectomy, laparoscopic (N/A, 1/17/2020); [facility] US BREAST CYST ASPIRATION LEFT (9/21/2020); and  Breast Fine Needle Aspiration (Left, 09/21/2020).    Current Medications:      Current Outpatient Medications:     hydroxychloroquine (PLAQUENIL) 200 MG tablet, Take 1 tablet by mouth 2 times daily, Disp: 60 tablet, Rfl: 5    leflunomide (ARAVA) 20 MG tablet, Take 1 tablet by mouth daily, Disp: 30 tablet, Rfl: 0    potassium chloride (KLOR-CON M) 20 MEQ extended release tablet, Take 1 tablet by mouth 2 times daily, Disp: 60 tablet, Rfl: 5    magnesium oxide (MAG-OX) 400 MG tablet, Take 1 tablet by mouth daily, Disp: 30 tablet, Rfl: 1    losartan (COZAAR) 100 MG tablet, TAKE ONE TABLET DAILY, BY MOUTH., Disp: 90 tablet, Rfl: 1    hydroCHLOROthiazide (HYDRODIURIL) 25 MG tablet, TAKE ONE TABLET DAILY, BY MOUTH., Disp: 90 tablet, Rfl: 1    metoprolol succinate (TOPROL XL) 25 MG extended release tablet, TAKE ONE TABLET DAILY, BY MOUTH., Disp: 90 tablet, Rfl: 1    omeprazole (PRILOSEC) 40 MG delayed release

## 2024-01-30 ENCOUNTER — HOSPITAL ENCOUNTER (EMERGENCY)
Age: 54
Discharge: HOME | End: 2024-01-30
Attending: EMERGENCY MEDICINE
Payer: MEDICAID

## 2024-01-30 VITALS
SYSTOLIC BLOOD PRESSURE: 123 MMHG | TEMPERATURE: 98 F | HEART RATE: 82 BPM | RESPIRATION RATE: 16 BRPM | WEIGHT: 210 LBS | BODY MASS INDEX: 34.99 KG/M2 | DIASTOLIC BLOOD PRESSURE: 75 MMHG | HEIGHT: 65 IN | OXYGEN SATURATION: 95 %

## 2024-01-30 DIAGNOSIS — E83.42 HYPOMAGNESEMIA: ICD-10-CM

## 2024-01-30 DIAGNOSIS — E87.6 HYPOKALEMIA: Primary | ICD-10-CM

## 2024-01-30 DIAGNOSIS — R06.4 HYPERVENTILATION: ICD-10-CM

## 2024-01-30 LAB
ALBUMIN SERPL BCP-MCNC: 4.2 GM/DL (ref 3.4–5)
ALP SERPL-CCNC: 77 U/L (ref 46–116)
ALT SERPL W P-5'-P-CCNC: 32 U/L (ref 14–63)
ANION GAP SERPL CALC-SCNC: 12 MEQ/L (ref 8–16)
AST SERPL W P-5'-P-CCNC: 28 U/L (ref 15–37)
BASOPHILS # BLD: 0.3 % (ref 0–3)
BASOPHILS ABSOLUTE: 0 THOU/MM3 (ref 0–0.1)
BILIRUB SERPL-MCNC: 0.5 MG/DL (ref 0.2–1)
BUN SERPL-MCNC: 13 MG/DL (ref 7–18)
CALCIUM SERPL-MCNC: 8.4 MG/DL (ref 8.5–10.1)
CHLORIDE SERPL-SCNC: 100 MEQ/L (ref 98–107)
CO2 SERPL-SCNC: 28 MEQ/L (ref 21–32)
CREAT SERPL-MCNC: 1.7 MG/DL (ref 0.6–1.3)
EKG ATRIAL RATE: 76 BPM
EKG P AXIS: 18 DEGREES
EKG P-R INTERVAL: 142 MS
EKG Q-T INTERVAL: 576 MS
EKG QRS DURATION: 86 MS
EKG QTC CALCULATION (BAZETT): 648 MS
EKG R AXIS: 16 DEGREES
EKG T AXIS: -11 DEGREES
EKG VENTRICULAR RATE: 76 BPM
EOSINOPHILS ABSOLUTE: 0 THOU/MM3 (ref 0–0.5)
EOSINOPHILS RELATIVE PERCENT: 0.3 % (ref 0–4)
GFR SERPL CREATININE-BSD FRML MDRD: 36 ML/MIN/1.73M2
GLUCOSE SERPL-MCNC: 122 MG/DL (ref 74–106)
HCT VFR BLD CALC: 41.5 % (ref 37–47)
HEMOGLOBIN: 14.1 GM/DL (ref 12–16)
IMMATURE GRANS (ABS): 0 THOU/MM3 (ref 0–0.07)
IMMATURE GRANULOCYTES: 0 %
LYMPHOCYTES # BLD AUTO: 21 % (ref 15–47)
LYMPHOCYTES ABSOLUTE: 1.6 THOU/MM3 (ref 1–4.8)
MAGNESIUM SERPL-MCNC: 1 MG/DL (ref 1.8–2.4)
MCH RBC QN AUTO: 28.8 PG (ref 26–32)
MCHC RBC AUTO-ENTMCNC: 34 GM/DL (ref 31–35)
MCV RBC AUTO: 84.7 FL (ref 81–99)
MONOCYTES: 0.7 THOU/MM3 (ref 0.3–1.3)
MONOCYTES: 9.2 % (ref 0–12)
PDW BLD-RTO: 13.2 % (ref 11.5–14.9)
PLATELET # BLD AUTO: 310 THOU/MM3 (ref 130–400)
PMV BLD AUTO: 9.7 FL (ref 9.4–12.4)
POTASSIUM SERPL-SCNC: 2.2 MEQ/L (ref 3.5–5.1)
PROT SERPL-MCNC: 8 GM/DL (ref 6.4–8.2)
RBC # BLD: 4.9 MILL/MM3 (ref 4.1–5.3)
SEG NEUTROPHILS: 69.1 % (ref 43–75)
SEGMENTED NEUTROPHILS ABSOLUTE COUNT: 5.4 THOU/MM3 (ref 1.8–7.7)
SODIUM SERPL-SCNC: 140 MEQ/L (ref 136–145)
TROPONIN, HIGH SENSITIVITY: < 4 PG/ML (ref 0–51.3)
WBC # BLD: 7.8 THOU/MM3 (ref 4.8–10.8)

## 2024-01-30 PROCEDURE — 6360000002 HC RX W HCPCS: Performed by: EMERGENCY MEDICINE

## 2024-01-30 PROCEDURE — 99284 EMERGENCY DEPT VISIT MOD MDM: CPT

## 2024-01-30 PROCEDURE — 83735 ASSAY OF MAGNESIUM: CPT

## 2024-01-30 PROCEDURE — 6370000000 HC RX 637 (ALT 250 FOR IP): Performed by: EMERGENCY MEDICINE

## 2024-01-30 PROCEDURE — 80053 COMPREHEN METABOLIC PANEL: CPT

## 2024-01-30 PROCEDURE — 93005 ELECTROCARDIOGRAM TRACING: CPT | Performed by: EMERGENCY MEDICINE

## 2024-01-30 PROCEDURE — 85025 COMPLETE CBC W/AUTO DIFF WBC: CPT

## 2024-01-30 PROCEDURE — 93010 ELECTROCARDIOGRAM REPORT: CPT | Performed by: INTERNAL MEDICINE

## 2024-01-30 PROCEDURE — 84484 ASSAY OF TROPONIN QUANT: CPT

## 2024-01-30 PROCEDURE — 96365 THER/PROPH/DIAG IV INF INIT: CPT

## 2024-01-30 RX ORDER — IBUPROFEN 200 MG
400 TABLET ORAL ONCE
Status: COMPLETED | OUTPATIENT
Start: 2024-01-30 | End: 2024-01-30

## 2024-01-30 RX ORDER — MAGNESIUM SULFATE 1 G/100ML
1000 INJECTION INTRAVENOUS ONCE
Status: COMPLETED | OUTPATIENT
Start: 2024-01-30 | End: 2024-01-30

## 2024-01-30 RX ORDER — POTASSIUM CHLORIDE 750 MG/1
40 TABLET, FILM COATED, EXTENDED RELEASE ORAL ONCE
Status: COMPLETED | OUTPATIENT
Start: 2024-01-30 | End: 2024-01-30

## 2024-01-30 RX ORDER — ACETAMINOPHEN 500 MG
1000 TABLET ORAL ONCE
Status: COMPLETED | OUTPATIENT
Start: 2024-01-30 | End: 2024-01-30

## 2024-01-30 RX ADMIN — ACETAMINOPHEN 1000 MG: 500 TABLET ORAL at 01:49

## 2024-01-30 RX ADMIN — POTASSIUM CHLORIDE 40 MEQ: 750 TABLET, EXTENDED RELEASE ORAL at 01:17

## 2024-01-30 RX ADMIN — IBUPROFEN 400 MG: 200 TABLET, FILM COATED ORAL at 01:49

## 2024-01-30 RX ADMIN — MAGNESIUM SULFATE HEPTAHYDRATE 1000 MG: 1 INJECTION, SOLUTION INTRAVENOUS at 01:29

## 2024-01-30 ASSESSMENT — PAIN DESCRIPTION - LOCATION: LOCATION: BACK

## 2024-01-30 ASSESSMENT — PAIN SCALES - GENERAL: PAINLEVEL_OUTOF10: 4

## 2024-01-30 ASSESSMENT — PAIN - FUNCTIONAL ASSESSMENT: PAIN_FUNCTIONAL_ASSESSMENT: 0-10

## 2024-01-30 ASSESSMENT — PAIN DESCRIPTION - ORIENTATION: ORIENTATION: LOWER

## 2024-01-30 NOTE — ED TRIAGE NOTES
Patient presents via EMS with c/o numbness and tingling in her hands and feet and also sina face. Feelings began last evening around 2200 while at work at [employer]. Feels like she was Hyperventilating while driving home so she stopped at her aunt's house for assistance.

## 2024-01-30 NOTE — ED NOTES
Discharge teaching and instructions for condition explained to patient. AVS reviewed. Went over prescriptions with patient. Patient voiced understanding regarding prescriptions, follow up appointments and care of self at home. Pt discharged to home in stable condition per father's care.

## 2024-01-30 NOTE — DISCHARGE INSTRUCTIONS
Continue home medications of magnesium.  Do not take your current potassium pills at home.  Instead I have changed your potassium dose to 50 mEq 3 times a day for the next 3 days.  Have outpatient blood test on Friday morning to check your potassium and your magnesium.  Follow-up with your primary care.  Monitor for weakness cramping or other symptoms.

## 2024-01-30 NOTE — ED PROVIDER NOTES
within normal limits   MAGNESIUM - Abnormal; Notable for the following components:    Magnesium 1.0 (*)     All other components within normal limits   GLOMERULAR FILTRATION RATE, ESTIMATED - Abnormal; Notable for the following components:    GFR, Estimated 36 (*)     All other components within normal limits   CBC WITH AUTO DIFFERENTIAL   TROPONIN   ANION GAP       All other labs were within normal range or not returned as of this dictation.    MIPS:  Not applicable      EMERGENCY DEPARTMENT COURSE and DIFFERENTIAL DIAGNOSIS/MDM:   Presents with shaking paresthesias.  Clinical exam is benign although has some continued paresthesias may be some carpopedal spasm.  Denies chest pain or shortness of breath although felt funny across her whole body.  Checking some routine labs electrolytes EKG etc.    1)  Number and Complexity of Problems  Problem List This Visit: Tingling paresthesias  Problem List Items Addressed This Visit    None  Visit Diagnoses       Hypokalemia    -  Primary    Relevant Orders    Comprehensive Metabolic Panel    Hypomagnesemia        Relevant Orders    Comprehensive Metabolic Panel    Magnesium    Hyperventilation                Differential Diagnosis: Electrolyte disturbance, hyperventilation,    Diagnoses Considered but Do Not Suspect:  Not applicable    Pertinent Comorbid Conditions:   History of depression    2)  Data Reviewed  My EKG interpretation: Sinus rate and rhythm without ischemia or infarction    Decision Rules/Scores utilized:  Not applicable    Tests considered but not ordered and why:  Not applicable      External Documents Reviewed:  Not applicable      Imaging that is independently reviewed with the radiologist report, not interpreted by me are:  Not applicable    Imaging that is independently reviewed and interpreted by me as:  Not applicable    See more data below for the lab and radiology tests and orders.    3)  Treatment and Disposition    Patient repeat assessment:

## 2024-01-30 NOTE — ED NOTES
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 Pt states she then left work and while driving home noted she was tachypnic and her hands were clenching.  She stopped at a friend or family members house and called EMS> Pt triage exam room 1. Alert and oriented. Skin warm and dry.  Respirations easy and unlabored. Family arrived

## 2024-01-31 ENCOUNTER — TELEPHONE (OUTPATIENT)
Dept: FAMILY MEDICINE CLINIC | Age: 54
End: 2024-01-31

## 2024-01-31 ENCOUNTER — OFFICE VISIT (OUTPATIENT)
Dept: FAMILY MEDICINE CLINIC | Age: 54
End: 2024-01-31
Payer: MEDICAID

## 2024-01-31 ENCOUNTER — NURSE ONLY (OUTPATIENT)
Dept: LAB | Age: 54
End: 2024-01-31

## 2024-01-31 VITALS
WEIGHT: 220 LBS | TEMPERATURE: 97.2 F | RESPIRATION RATE: 16 BRPM | HEART RATE: 88 BPM | BODY MASS INDEX: 36.65 KG/M2 | OXYGEN SATURATION: 94 % | DIASTOLIC BLOOD PRESSURE: 86 MMHG | SYSTOLIC BLOOD PRESSURE: 132 MMHG | HEIGHT: 65 IN

## 2024-01-31 DIAGNOSIS — E83.42 HYPOMAGNESEMIA: ICD-10-CM

## 2024-01-31 DIAGNOSIS — N17.9 AKI (ACUTE KIDNEY INJURY) (HCC): ICD-10-CM

## 2024-01-31 DIAGNOSIS — E87.6 HYPOKALEMIA: ICD-10-CM

## 2024-01-31 DIAGNOSIS — E87.6 HYPOKALEMIA: Primary | ICD-10-CM

## 2024-01-31 LAB
ALBUMIN SERPL BCG-MCNC: 4.5 G/DL (ref 3.5–5.1)
ANION GAP SERPL CALC-SCNC: 15 MEQ/L (ref 8–16)
BILIRUBIN, POC: NEGATIVE
BLOOD URINE, POC: ABNORMAL
BUN SERPL-MCNC: 12 MG/DL (ref 7–22)
CALCIUM SERPL-MCNC: 9.2 MG/DL (ref 8.5–10.5)
CHLORIDE SERPL-SCNC: 98 MEQ/L (ref 98–111)
CLARITY, POC: ABNORMAL
CO2 SERPL-SCNC: 29 MEQ/L (ref 23–33)
COLOR, POC: YELLOW
CREAT SERPL-MCNC: 0.7 MG/DL (ref 0.4–1.2)
GFR SERPL CREATININE-BSD FRML MDRD: > 60 ML/MIN/1.73M2
GLUCOSE SERPL-MCNC: 111 MG/DL (ref 70–108)
GLUCOSE URINE, POC: NEGATIVE
KETONES, POC: ABNORMAL
LEUKOCYTE EST, POC: ABNORMAL
MAGNESIUM SERPL-MCNC: 1.7 MG/DL (ref 1.6–2.4)
NITRITE, POC: NEGATIVE
PH, POC: 6
PHOSPHATE SERPL-MCNC: 1.6 MG/DL (ref 2.4–4.7)
POTASSIUM SERPL-SCNC: 2.9 MEQ/L (ref 3.5–5.2)
PROTEIN, POC: 100
SODIUM SERPL-SCNC: 142 MEQ/L (ref 135–145)
SPECIFIC GRAVITY, POC: 1.02
UROBILINOGEN, POC: 0.2

## 2024-01-31 PROCEDURE — 99214 OFFICE O/P EST MOD 30 MIN: CPT | Performed by: NURSE PRACTITIONER

## 2024-01-31 PROCEDURE — 81003 URINALYSIS AUTO W/O SCOPE: CPT | Performed by: NURSE PRACTITIONER

## 2024-01-31 RX ORDER — CIPROFLOXACIN 250 MG/1
250 TABLET, FILM COATED ORAL 2 TIMES DAILY
Qty: 14 TABLET | Refills: 0 | Status: SHIPPED | OUTPATIENT
Start: 2024-01-31 | End: 2024-02-07

## 2024-01-31 ASSESSMENT — ENCOUNTER SYMPTOMS
EYES NEGATIVE: 1
DIARRHEA: 1
RESPIRATORY NEGATIVE: 1
BACK PAIN: 1

## 2024-01-31 ASSESSMENT — PATIENT HEALTH QUESTIONNAIRE - PHQ9
SUM OF ALL RESPONSES TO PHQ QUESTIONS 1-9: 0
1. LITTLE INTEREST OR PLEASURE IN DOING THINGS: 0
SUM OF ALL RESPONSES TO PHQ QUESTIONS 1-9: 0
2. FEELING DOWN, DEPRESSED OR HOPELESS: 0
SUM OF ALL RESPONSES TO PHQ9 QUESTIONS 1 & 2: 0

## 2024-01-31 NOTE — PROGRESS NOTES
Amie De Santiago is a 53 y.o. female whopresents today for :  Chief Complaint   Patient presents with    Follow-up     Vitals:    01/31/24 1333   BP: 132/86   Pulse: 88   Resp: 16   Temp: 97.2 °F (36.2 °C)   SpO2: 94%       HPI:     HPI  Pt here for fu of er.  Pt was found to be very low in potassium and magnesium  she reports chronic diarrhea ever since her GB ws removed but has been much worse in the past year.  She was started on metformin in the past year.  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 OR    [facility] US BREAST CYST ASPIRATION LEFT  9/21/2020    [facility] US BREAST CYST ASPIRATION LEFT 9/21/2020 [facility]    US BREAST FINE NEEDLE ASPIRATION Left 09/21/2020     Family History   Problem Relation Age of Onset    Cancer Mother 62        renal cancer    Depression Mother         SAD    High Blood Pressure Mother     Other Mother         gallbladder disease    Breast Cancer Mother 61        2nd time age 63, 3rd time age 69 (metastatic at this time)    Diabetes Father     Lung Cancer Father     Heart Disease Maternal Grandmother     High Blood Pressure Maternal Grandmother     Diabetes Maternal

## 2024-02-02 LAB
BACTERIA UR CULT: ABNORMAL
ORGANISM: ABNORMAL

## 2024-02-12 ENCOUNTER — HOSPITAL ENCOUNTER (OUTPATIENT)
Age: 54
Discharge: HOME | End: 2024-02-12
Payer: MEDICAID

## 2024-02-12 DIAGNOSIS — Z79.899 ENCOUNTER FOR LONG-TERM (CURRENT) USE OF HIGH-RISK MEDICATION: ICD-10-CM

## 2024-02-12 DIAGNOSIS — M06.9 RHEUMATOID ARTHRITIS INVOLVING MULTIPLE SITES, UNSPECIFIED WHETHER RHEUMATOID FACTOR PRESENT (HCC): ICD-10-CM

## 2024-02-12 DIAGNOSIS — E87.6 HYPOKALEMIA: ICD-10-CM

## 2024-02-12 DIAGNOSIS — E83.42 HYPOMAGNESEMIA: ICD-10-CM

## 2024-02-12 LAB
BASOPHILS ABSOLUTE: 0 THOU/MM3 (ref 0–0.1)
BASOPHILS NFR BLD AUTO: 0.8 %
DEPRECATED RDW RBC AUTO: 44.9 FL (ref 35–45)
EOSINOPHIL NFR BLD AUTO: 2.7 %
EOSINOPHILS ABSOLUTE: 0.1 THOU/MM3 (ref 0–0.4)
ERYTHROCYTE [DISTWIDTH] IN BLOOD BY AUTOMATED COUNT: 13.7 % (ref 11.5–14.5)
HCT VFR BLD AUTO: 42.5 % (ref 37–47)
HGB BLD-MCNC: 13.6 GM/DL (ref 12–16)
IMM GRANULOCYTES # BLD AUTO: 0.01 THOU/MM3 (ref 0–0.07)
IMM GRANULOCYTES NFR BLD AUTO: 0.3 %
LYMPHOCYTES ABSOLUTE: 1.4 THOU/MM3 (ref 1–4.8)
LYMPHOCYTES NFR BLD AUTO: 39.1 %
MCH RBC QN AUTO: 28.6 PG (ref 26–33)
MCHC RBC AUTO-ENTMCNC: 32 GM/DL (ref 32.2–35.5)
MCV RBC AUTO: 89.5 FL (ref 81–99)
MONOCYTES ABSOLUTE: 0.3 THOU/MM3 (ref 0.4–1.3)
MONOCYTES NFR BLD AUTO: 8.4 %
NEUTROPHILS NFR BLD AUTO: 48.7 %
NRBC BLD AUTO-RTO: 0 /100 WBC
PLATELET # BLD AUTO: 317 THOU/MM3 (ref 130–400)
PMV BLD AUTO: 10.9 FL (ref 9.4–12.4)
RBC # BLD AUTO: 4.75 MILL/MM3 (ref 4.2–5.4)
SEGMENTED NEUTROPHILS ABSOLUTE COUNT: 1.8 THOU/MM3 (ref 1.8–7.7)
WBC # BLD AUTO: 3.7 THOU/MM3 (ref 4.8–10.8)

## 2024-02-12 PROCEDURE — 36415 COLL VENOUS BLD VENIPUNCTURE: CPT

## 2024-02-12 PROCEDURE — 83735 ASSAY OF MAGNESIUM: CPT

## 2024-02-12 PROCEDURE — 85025 COMPLETE CBC W/AUTO DIFF WBC: CPT

## 2024-02-12 PROCEDURE — 86140 C-REACTIVE PROTEIN: CPT

## 2024-02-12 PROCEDURE — 85651 RBC SED RATE NONAUTOMATED: CPT

## 2024-02-12 PROCEDURE — 80053 COMPREHEN METABOLIC PANEL: CPT

## 2024-02-13 ENCOUNTER — TELEPHONE (OUTPATIENT)
Dept: FAMILY MEDICINE CLINIC | Age: 54
End: 2024-02-13

## 2024-02-13 DIAGNOSIS — E87.6 HYPOKALEMIA: Primary | ICD-10-CM

## 2024-02-13 LAB
ALBUMIN SERPL BCG-MCNC: 4.2 G/DL (ref 3.5–5.1)
ALP SERPL-CCNC: 71 U/L (ref 38–126)
ALT SERPL W/O P-5'-P-CCNC: 22 U/L (ref 11–66)
ANION GAP SERPL CALC-SCNC: 11 MEQ/L (ref 8–16)
AST SERPL-CCNC: 20 U/L (ref 5–40)
BILIRUB SERPL-MCNC: 0.5 MG/DL (ref 0.3–1.2)
BUN SERPL-MCNC: 10 MG/DL (ref 7–22)
CALCIUM SERPL-MCNC: 8.7 MG/DL (ref 8.5–10.5)
CHLORIDE SERPL-SCNC: 102 MEQ/L (ref 98–111)
CO2 SERPL-SCNC: 28 MEQ/L (ref 23–33)
CREAT SERPL-MCNC: 0.7 MG/DL (ref 0.4–1.2)
CRP SERPL-MCNC: < 0.3 MG/DL (ref 0–1)
ERYTHROCYTE [SEDIMENTATION RATE] IN BLOOD BY WESTERGREN METHOD: 13 MM/HR (ref 0–20)
GFR SERPL CREATININE-BSD FRML MDRD: > 60 ML/MIN/1.73M2
GLUCOSE SERPL-MCNC: 115 MG/DL (ref 70–108)
MAGNESIUM SERPL-MCNC: 1.6 MG/DL (ref 1.6–2.4)
POTASSIUM SERPL-SCNC: 2.9 MEQ/L (ref 3.5–5.2)
PROT SERPL-MCNC: 6.8 G/DL (ref 6.1–8)
SODIUM SERPL-SCNC: 141 MEQ/L (ref 135–145)

## 2024-02-13 RX ORDER — POTASSIUM CHLORIDE 20 MEQ/1
20 TABLET, EXTENDED RELEASE ORAL DAILY
Qty: 30 TABLET | Refills: 0 | Status: SHIPPED | OUTPATIENT
Start: 2024-02-13

## 2024-02-13 NOTE — TELEPHONE ENCOUNTER
----- Message from Tnoya Means MA sent at 2/13/2024 11:28 AM EST -----  Pt notified. Diarrhea has improved.

## 2024-02-14 DIAGNOSIS — E83.42 HYPOMAGNESEMIA: ICD-10-CM

## 2024-02-14 RX ORDER — MAGNESIUM OXIDE 400 MG/1
1 TABLET ORAL DAILY
Qty: 30 TABLET | Refills: 1 | Status: SHIPPED | OUTPATIENT
Start: 2024-02-14 | End: 2024-02-14 | Stop reason: SDUPTHER

## 2024-02-14 RX ORDER — MAGNESIUM OXIDE 400 MG/1
1 TABLET ORAL DAILY
Qty: 30 TABLET | Refills: 1 | Status: SHIPPED | OUTPATIENT
Start: 2024-02-14

## 2024-02-14 NOTE — TELEPHONE ENCOUNTER
Last visit- 1/31/2024  Next visit- Visit date not found    Requested Prescriptions      No prescriptions requested or ordered in this encounter

## 2024-02-27 ENCOUNTER — HOSPITAL ENCOUNTER (OUTPATIENT)
Age: 54
Discharge: HOME | End: 2024-02-27
Payer: MEDICAID

## 2024-02-27 DIAGNOSIS — N17.9 AKI (ACUTE KIDNEY INJURY) (HCC): ICD-10-CM

## 2024-02-27 DIAGNOSIS — E83.42 HYPOMAGNESEMIA: ICD-10-CM

## 2024-02-27 DIAGNOSIS — E87.6 HYPOKALEMIA: ICD-10-CM

## 2024-02-27 LAB
ALBUMIN SERPL BCG-MCNC: 4.1 G/DL (ref 3.5–5.1)
ANION GAP SERPL CALC-SCNC: 13 MEQ/L (ref 8–16)
BUN SERPL-MCNC: 10 MG/DL (ref 7–22)
CALCIUM SERPL-MCNC: 9.6 MG/DL (ref 8.5–10.5)
CHLORIDE SERPL-SCNC: 101 MEQ/L (ref 98–111)
CO2 SERPL-SCNC: 28 MEQ/L (ref 23–33)
CREAT SERPL-MCNC: 0.7 MG/DL (ref 0.4–1.2)
GFR SERPL CREATININE-BSD FRML MDRD: > 60 ML/MIN/1.73M2
GLUCOSE SERPL-MCNC: 113 MG/DL (ref 70–108)
MAGNESIUM SERPL-MCNC: 1.5 MG/DL (ref 1.6–2.4)
PHOSPHATE SERPL-MCNC: 2.2 MG/DL (ref 2.4–4.7)
POTASSIUM SERPL-SCNC: 3.3 MEQ/L (ref 3.5–5.2)
SODIUM SERPL-SCNC: 142 MEQ/L (ref 135–145)

## 2024-02-27 PROCEDURE — 36415 COLL VENOUS BLD VENIPUNCTURE: CPT

## 2024-02-27 PROCEDURE — 83735 ASSAY OF MAGNESIUM: CPT

## 2024-02-27 PROCEDURE — 80069 RENAL FUNCTION PANEL: CPT

## 2024-02-28 ENCOUNTER — TELEPHONE (OUTPATIENT)
Dept: FAMILY MEDICINE CLINIC | Age: 54
End: 2024-02-28

## 2024-02-28 DIAGNOSIS — E83.42 HYPOMAGNESEMIA: ICD-10-CM

## 2024-02-28 RX ORDER — POTASSIUM CHLORIDE 20 MEQ/1
40 TABLET, EXTENDED RELEASE ORAL DAILY
Qty: 60 TABLET | Refills: 4 | Status: SHIPPED | OUTPATIENT
Start: 2024-02-28

## 2024-02-28 RX ORDER — MAGNESIUM OXIDE 400 MG/1
1 TABLET ORAL 2 TIMES DAILY
Qty: 60 TABLET | Refills: 5 | Status: SHIPPED | OUTPATIENT
Start: 2024-02-28

## 2024-02-28 NOTE — TELEPHONE ENCOUNTER
Please call pt.  Her potassium is improved but still slight low  same with her magnesium.  Increase her potassium to 2 tablets daily and same for her magnesium.  Recheck labs in 1 month

## 2024-03-12 ENCOUNTER — TELEPHONE (OUTPATIENT)
Dept: FAMILY MEDICINE CLINIC | Age: 54
End: 2024-03-12

## 2024-03-12 ENCOUNTER — HOSPITAL ENCOUNTER (OUTPATIENT)
Age: 54
Discharge: HOME | End: 2024-03-12
Payer: MEDICAID

## 2024-03-12 DIAGNOSIS — E83.42 HYPOMAGNESEMIA: ICD-10-CM

## 2024-03-12 DIAGNOSIS — E87.6 HYPOKALEMIA: ICD-10-CM

## 2024-03-12 DIAGNOSIS — N17.9 AKI (ACUTE KIDNEY INJURY) (HCC): ICD-10-CM

## 2024-03-12 LAB
ALBUMIN SERPL BCG-MCNC: 4.1 G/DL (ref 3.5–5.1)
ANION GAP SERPL CALC-SCNC: 13 MEQ/L (ref 8–16)
BUN SERPL-MCNC: 10 MG/DL (ref 7–22)
CALCIUM SERPL-MCNC: 9.7 MG/DL (ref 8.5–10.5)
CHLORIDE SERPL-SCNC: 101 MEQ/L (ref 98–111)
CO2 SERPL-SCNC: 26 MEQ/L (ref 23–33)
CREAT SERPL-MCNC: 0.8 MG/DL (ref 0.4–1.2)
GFR SERPL CREATININE-BSD FRML MDRD: > 60 ML/MIN/1.73M2
GLUCOSE SERPL-MCNC: 116 MG/DL (ref 70–108)
MAGNESIUM SERPL-MCNC: 1.7 MG/DL (ref 1.6–2.4)
PHOSPHATE SERPL-MCNC: 3.2 MG/DL (ref 2.4–4.7)
POTASSIUM SERPL-SCNC: 3.4 MEQ/L (ref 3.5–5.2)
SODIUM SERPL-SCNC: 140 MEQ/L (ref 135–145)

## 2024-03-12 PROCEDURE — 36415 COLL VENOUS BLD VENIPUNCTURE: CPT

## 2024-03-12 PROCEDURE — 80069 RENAL FUNCTION PANEL: CPT

## 2024-03-12 PROCEDURE — 83735 ASSAY OF MAGNESIUM: CPT

## 2024-03-12 NOTE — TELEPHONE ENCOUNTER
Please call pt.  Magnesium is now normal. Potassium is near normal.  Continue current magnesium dose and with her potassium  stay with 2 tabs a day and one day a week take a 3rd tablet

## 2024-03-26 ENCOUNTER — TELEPHONE (OUTPATIENT)
Dept: FAMILY MEDICINE CLINIC | Age: 54
End: 2024-03-26

## 2024-03-26 DIAGNOSIS — J06.9 URI, ACUTE: ICD-10-CM

## 2024-03-26 RX ORDER — AZITHROMYCIN 250 MG/1
TABLET, FILM COATED ORAL
Qty: 1 PACKET | Refills: 0 | Status: SHIPPED | OUTPATIENT
Start: 2024-03-26 | End: 2024-04-05

## 2024-03-26 NOTE — TELEPHONE ENCOUNTER
Pt called stating that she began with sx's of a sinus infection on Sunday night. She complains of sinus drainage, that is yellow-green in color and a sinus headache. She denies any fevers. She has been using OTC tylenol sinus for relief. She is requesting for something to be sent to [facility].

## 2024-04-03 DIAGNOSIS — K21.00 GASTROESOPHAGEAL REFLUX DISEASE WITH ESOPHAGITIS, UNSPECIFIED WHETHER HEMORRHAGE: ICD-10-CM

## 2024-04-03 DIAGNOSIS — M06.9 RHEUMATOID ARTHRITIS INVOLVING MULTIPLE SITES, UNSPECIFIED WHETHER RHEUMATOID FACTOR PRESENT (HCC): ICD-10-CM

## 2024-04-03 DIAGNOSIS — K20.0 EOSINOPHILIC ESOPHAGITIS: ICD-10-CM

## 2024-04-03 DIAGNOSIS — I10 ESSENTIAL HYPERTENSION: ICD-10-CM

## 2024-04-03 DIAGNOSIS — Z79.899 ENCOUNTER FOR LONG-TERM (CURRENT) USE OF HIGH-RISK MEDICATION: ICD-10-CM

## 2024-04-03 RX ORDER — OMEPRAZOLE 40 MG/1
CAPSULE, DELAYED RELEASE ORAL
Qty: 60 CAPSULE | Refills: 6 | Status: SHIPPED | OUTPATIENT
Start: 2024-04-03

## 2024-04-03 RX ORDER — METOPROLOL SUCCINATE 25 MG/1
TABLET, EXTENDED RELEASE ORAL
Qty: 90 TABLET | Refills: 2 | Status: SHIPPED | OUTPATIENT
Start: 2024-04-03

## 2024-04-03 RX ORDER — LOSARTAN POTASSIUM 100 MG/1
TABLET ORAL
Qty: 90 TABLET | Refills: 2 | Status: SHIPPED | OUTPATIENT
Start: 2024-04-03

## 2024-04-03 RX ORDER — ATORVASTATIN CALCIUM 20 MG/1
20 TABLET, FILM COATED ORAL DAILY
Qty: 30 TABLET | Refills: 11 | Status: SHIPPED | OUTPATIENT
Start: 2024-04-03

## 2024-04-03 RX ORDER — HYDROCHLOROTHIAZIDE 25 MG/1
25 TABLET ORAL DAILY
Qty: 90 TABLET | Refills: 2 | Status: SHIPPED | OUTPATIENT
Start: 2024-04-03

## 2024-04-03 NOTE — TELEPHONE ENCOUNTER
DOLV: 1/11/24  DONV: 4/11/24  LAST LAB DRAW: 2/12/24  LAST TB TEST: 9/1/2020    Lab Results   Component Value Date     03/12/2024    K 3.4 (L) 03/12/2024     03/12/2024    CO2 26 03/12/2024    BUN 10 03/12/2024    CREATININE 0.8 03/12/2024    GLUCOSE 116 (H) 03/12/2024    CALCIUM 9.7 03/12/2024    PROT 6.8 02/12/2024    LABALBU 4.1 03/12/2024    BILITOT 0.5 02/12/2024    ALKPHOS 71 02/12/2024    AST 20 02/12/2024    ALT 22 02/12/2024    LABGLOM >60 03/12/2024       No results for input(s): \"WBC\", \"HGB\", \"HCT\", \"MCV\", \"PLT\" in the last 720 hours.    Lab Results   Component Value Date    SEDRATE 13 02/12/2024       Lab Results   Component Value Date    CRP < 0.30 02/12/2024

## 2024-04-04 RX ORDER — LEFLUNOMIDE 20 MG/1
20 TABLET ORAL DAILY
Qty: 30 TABLET | Refills: 3 | Status: SHIPPED | OUTPATIENT
Start: 2024-04-04

## 2024-04-10 ENCOUNTER — HOSPITAL ENCOUNTER (OUTPATIENT)
Age: 54
Discharge: HOME | End: 2024-04-10
Payer: MEDICAID

## 2024-04-10 ENCOUNTER — HOSPITAL ENCOUNTER (OUTPATIENT)
Dept: MAMMOGRAPHY | Age: 54
Discharge: HOME | End: 2024-04-10
Payer: MEDICAID

## 2024-04-10 VITALS — WEIGHT: 200 LBS | BODY MASS INDEX: 33.32 KG/M2 | HEIGHT: 65 IN

## 2024-04-10 DIAGNOSIS — Z79.899 ENCOUNTER FOR LONG-TERM (CURRENT) USE OF HIGH-RISK MEDICATION: ICD-10-CM

## 2024-04-10 DIAGNOSIS — M06.9 RHEUMATOID ARTHRITIS INVOLVING MULTIPLE SITES, UNSPECIFIED WHETHER RHEUMATOID FACTOR PRESENT (HCC): ICD-10-CM

## 2024-04-10 DIAGNOSIS — Z12.39 BREAST SCREENING: ICD-10-CM

## 2024-04-10 DIAGNOSIS — N17.9 AKI (ACUTE KIDNEY INJURY) (HCC): ICD-10-CM

## 2024-04-10 DIAGNOSIS — E83.42 HYPOMAGNESEMIA: ICD-10-CM

## 2024-04-10 DIAGNOSIS — E87.6 HYPOKALEMIA: ICD-10-CM

## 2024-04-10 LAB
ALBUMIN SERPL BCG-MCNC: 4.1 G/DL (ref 3.5–5.1)
ALP SERPL-CCNC: 72 U/L (ref 38–126)
ALT SERPL W/O P-5'-P-CCNC: 12 U/L (ref 11–66)
ANION GAP SERPL CALC-SCNC: 12 MEQ/L (ref 8–16)
AST SERPL-CCNC: 15 U/L (ref 5–40)
BASOPHILS ABSOLUTE: 0 THOU/MM3 (ref 0–0.1)
BASOPHILS NFR BLD AUTO: 0.8 %
BILIRUB SERPL-MCNC: 0.4 MG/DL (ref 0.3–1.2)
BUN SERPL-MCNC: 12 MG/DL (ref 7–22)
CALCIUM SERPL-MCNC: 8.8 MG/DL (ref 8.5–10.5)
CHLORIDE SERPL-SCNC: 102 MEQ/L (ref 98–111)
CO2 SERPL-SCNC: 26 MEQ/L (ref 23–33)
CREAT SERPL-MCNC: 0.6 MG/DL (ref 0.4–1.2)
CRP SERPL-MCNC: 0.33 MG/DL (ref 0–1)
DEPRECATED RDW RBC AUTO: 43.1 FL (ref 35–45)
EOSINOPHIL NFR BLD AUTO: 2.3 %
EOSINOPHILS ABSOLUTE: 0.1 THOU/MM3 (ref 0–0.4)
ERYTHROCYTE [DISTWIDTH] IN BLOOD BY AUTOMATED COUNT: 13.3 % (ref 11.5–14.5)
ERYTHROCYTE [SEDIMENTATION RATE] IN BLOOD BY WESTERGREN METHOD: 11 MM/HR (ref 0–20)
GFR SERPL CREATININE-BSD FRML MDRD: > 90 ML/MIN/1.73M2
GLUCOSE SERPL-MCNC: 86 MG/DL (ref 70–108)
HCT VFR BLD AUTO: 41.1 % (ref 37–47)
HGB BLD-MCNC: 13.6 GM/DL (ref 12–16)
IMM GRANULOCYTES # BLD AUTO: 0.01 THOU/MM3 (ref 0–0.07)
IMM GRANULOCYTES NFR BLD AUTO: 0.2 %
LYMPHOCYTES ABSOLUTE: 1.7 THOU/MM3 (ref 1–4.8)
LYMPHOCYTES NFR BLD AUTO: 35.9 %
MAGNESIUM SERPL-MCNC: 1.6 MG/DL (ref 1.6–2.4)
MCH RBC QN AUTO: 29.2 PG (ref 26–33)
MCHC RBC AUTO-ENTMCNC: 33.1 GM/DL (ref 32.2–35.5)
MCV RBC AUTO: 88.2 FL (ref 81–99)
MONOCYTES ABSOLUTE: 0.5 THOU/MM3 (ref 0.4–1.3)
MONOCYTES NFR BLD AUTO: 9.6 %
NEUTROPHILS NFR BLD AUTO: 51.2 %
NRBC BLD AUTO-RTO: 0 /100 WBC
PHOSPHATE SERPL-MCNC: 2.7 MG/DL (ref 2.4–4.7)
PLATELET # BLD AUTO: 272 THOU/MM3 (ref 130–400)
PMV BLD AUTO: 10.7 FL (ref 9.4–12.4)
POTASSIUM SERPL-SCNC: 3.5 MEQ/L (ref 3.5–5.2)
PROT SERPL-MCNC: 6.6 G/DL (ref 6.1–8)
RBC # BLD AUTO: 4.66 MILL/MM3 (ref 4.2–5.4)
SEGMENTED NEUTROPHILS ABSOLUTE COUNT: 2.4 THOU/MM3 (ref 1.8–7.7)
SODIUM SERPL-SCNC: 140 MEQ/L (ref 135–145)
WBC # BLD AUTO: 4.7 THOU/MM3 (ref 4.8–10.8)

## 2024-04-10 PROCEDURE — 85651 RBC SED RATE NONAUTOMATED: CPT

## 2024-04-10 PROCEDURE — 84100 ASSAY OF PHOSPHORUS: CPT

## 2024-04-10 PROCEDURE — 86140 C-REACTIVE PROTEIN: CPT

## 2024-04-10 PROCEDURE — 85025 COMPLETE CBC W/AUTO DIFF WBC: CPT

## 2024-04-10 PROCEDURE — 83735 ASSAY OF MAGNESIUM: CPT

## 2024-04-10 PROCEDURE — 80053 COMPREHEN METABOLIC PANEL: CPT

## 2024-04-10 PROCEDURE — 77067 SCR MAMMO BI INCL CAD: CPT

## 2024-04-10 PROCEDURE — 36415 COLL VENOUS BLD VENIPUNCTURE: CPT

## 2024-04-11 ENCOUNTER — OFFICE VISIT (OUTPATIENT)
Dept: RHEUMATOLOGY | Age: 54
End: 2024-04-11
Payer: MEDICAID

## 2024-04-11 VITALS
DIASTOLIC BLOOD PRESSURE: 80 MMHG | HEIGHT: 65 IN | BODY MASS INDEX: 33.31 KG/M2 | SYSTOLIC BLOOD PRESSURE: 156 MMHG | HEART RATE: 80 BPM | WEIGHT: 199.96 LBS | OXYGEN SATURATION: 97 %

## 2024-04-11 DIAGNOSIS — Z79.899 ENCOUNTER FOR LONG-TERM (CURRENT) USE OF HIGH-RISK MEDICATION: ICD-10-CM

## 2024-04-11 DIAGNOSIS — M06.00 SERONEGATIVE RHEUMATOID ARTHRITIS (HCC): Primary | ICD-10-CM

## 2024-04-11 PROCEDURE — 99214 OFFICE O/P EST MOD 30 MIN: CPT | Performed by: INTERNAL MEDICINE

## 2024-04-11 ASSESSMENT — JOINT PAIN
TOTAL NUMBER OF SWOLLEN JOINTS: 0
TOTAL NUMBER OF TENDER JOINTS: 0

## 2024-04-11 ASSESSMENT — ENCOUNTER SYMPTOMS
VOMITING: 0
COUGH: 0
SHORTNESS OF BREATH: 0
NAUSEA: 0
ABDOMINAL PAIN: 0

## 2024-04-11 NOTE — PROGRESS NOTES
[facility]   Rheumatology Clinic Note      4/11/2024       CHIEF COMPLAINT:    Chief Complaint   Patient presents with    3 Month Follow-Up     Rheumatoid arthritis involving multiple sites, unspecified whether rheumatoid factor present (HCC    Other     Pt is tolerating well.            HISTORY OF PRESENT ILLNESS:    53 y.o. female with seronegative rheumatoid arthritis presents for follow up. Presently, she is on hydroxychloroquine 200 mg bid and leflunomide 20 mg daily.    Past med: methotrexate (ineffective)    Overall doing well. No major flare ups since seen last.        Past Medical History:     has a past medical history of Abdominal pain, right upper quadrant, Arthritis, Calculus of gallbladder, Depression, Genetic testing, GERD (gastroesophageal reflux disease), High cholesterol, Hypertension, and Migraine.    Past Surgical History:     has a past surgical history that includes Cholecystectomy, laparoscopic (N/A, 1/17/2020); [facility] US BREAST CYST ASPIRATION LEFT (9/21/2020); and  Breast Fine Needle Aspiration (Left, 09/21/2020).    Current Medications:      Current Outpatient Medications:     leflunomide (ARAVA) 20 MG tablet, TAKE ONE TABLET DAILY, BY MOUTH., Disp: 30 tablet, Rfl: 3    losartan (COZAAR) 100 MG tablet, TAKE ONE TABLET DAILY, BY MOUTH., Disp: 90 tablet, Rfl: 2    metoprolol succinate (TOPROL XL) 25 MG extended release tablet, TAKE ONE TABLET DAILY, BY MOUTH., Disp: 90 tablet, Rfl: 2    hydroCHLOROthiazide (HYDRODIURIL) 25 MG tablet, TAKE ONE TABLET DAILY, BY MOUTH., Disp: 90 tablet, Rfl: 2    atorvastatin (LIPITOR) 20 MG tablet, TAKE ONE TABLET DAILY, BY MOUTH., Disp: 30 tablet, Rfl: 11    omeprazole (PRILOSEC) 40 MG delayed release capsule, TAKE ONE (1) CAPSULE TWICE DAILY, IN THE MORNING & AT BEDTIME, Disp: 60 capsule, Rfl: 6    potassium chloride (KLOR-CON M) 20 MEQ extended release tablet, Take 2 tablets by mouth daily (Patient taking differently: Take 1 tablet by mouth

## 2024-07-08 RX ORDER — VALACYCLOVIR HYDROCHLORIDE 1 G/1
2000 TABLET, FILM COATED ORAL 2 TIMES DAILY
Qty: 20 TABLET | Refills: 0 | Status: SHIPPED | OUTPATIENT
Start: 2024-07-08 | End: 2024-07-09

## 2024-07-08 NOTE — TELEPHONE ENCOUNTER
Pt requesting refill Valtrex    Her lips got burnt yesterday and she feels a cold sore coming on    Pt is leaving tomorrow am for country concert    Pt scheduled a physical for 7/18/2024    Nam

## 2024-10-09 ENCOUNTER — TELEMEDICINE (OUTPATIENT)
Dept: FAMILY MEDICINE CLINIC | Age: 54
End: 2024-10-09
Payer: MEDICAID

## 2024-10-09 DIAGNOSIS — M76.62 ACHILLES TENDINITIS OF BOTH LOWER EXTREMITIES: Primary | ICD-10-CM

## 2024-10-09 DIAGNOSIS — M76.61 ACHILLES TENDINITIS OF BOTH LOWER EXTREMITIES: Primary | ICD-10-CM

## 2024-10-09 PROCEDURE — 99213 OFFICE O/P EST LOW 20 MIN: CPT | Performed by: NURSE PRACTITIONER

## 2024-10-09 ASSESSMENT — ENCOUNTER SYMPTOMS
EYES NEGATIVE: 1
RESPIRATORY NEGATIVE: 1
GASTROINTESTINAL NEGATIVE: 1

## 2024-10-09 NOTE — PROGRESS NOTES
[]Able to follow commands      Eyes:  EOM    []  Normal  [] Abnormal-  Sclera  []  Normal  [] Abnormal -         Discharge []  None visible  [] Abnormal -    HENT:   [] Normocephalic, atraumatic.  [] Abnormal   [] Mouth/Throat: Mucous membranes are moist.     External Ears [] Normal  [] Abnormal-     Neck: [] No visualized mass     Pulmonary/Chest: [] Respiratory effort normal.  [] No visualized signs of difficulty breathing or respiratory distress        [] Abnormal-      Musculoskeletal:   [] Normal gait with no signs of ataxia         [] Normal range of motion of neck        [x] Abnormal-patient was able to use the camera to show her Achilles patient had swelling and evidence of chronic Achilles tendinitis on both ankles      Neurological:        [] No Facial Asymmetry (Cranial nerve 7 motor function) (limited exam to video visit)          [] No gaze palsy        [] Abnormal-         Skin:        [] No significant exanthematous lesions or discoloration noted on facial skin         [] Abnormal-            Psychiatric:       [] Normal Affect [] No Hallucinations        [] Abnormal-     Other pertinent observable physical exam findings-     ASSESSMENT/PLAN:  1. Achilles tendinitis of both lower extremities  Will refer to podiatry and physical therapy discussed stretching exercises as well as purchasing a night splint discussed using a heel insert  - Amb External Referral To Podiatry  - External Referral To Physical Therapy      No follow-ups on file.    Amie De Santiago, was evaluated through a synchronous (real-time) audio-video encounter. The patient (or guardian if applicable) is aware that this is a billable service, which includes applicable co-pays. This Virtual Visit was conducted with patient's (and/or legal guardian's) consent. Patient identification was verified, and a caregiver was present when appropriate.   The patient was located at Home: [address] [place] [address]  Provider was located at

## 2024-10-17 ENCOUNTER — OFFICE VISIT (OUTPATIENT)
Dept: RHEUMATOLOGY | Age: 54
End: 2024-10-17
Payer: MEDICAID

## 2024-10-17 ENCOUNTER — LAB (OUTPATIENT)
Dept: LAB | Age: 54
End: 2024-10-17

## 2024-10-17 VITALS
OXYGEN SATURATION: 98 % | WEIGHT: 231.8 LBS | HEIGHT: 65 IN | SYSTOLIC BLOOD PRESSURE: 160 MMHG | HEART RATE: 78 BPM | BODY MASS INDEX: 38.62 KG/M2 | DIASTOLIC BLOOD PRESSURE: 96 MMHG

## 2024-10-17 DIAGNOSIS — Z79.899 ENCOUNTER FOR LONG-TERM (CURRENT) USE OF HIGH-RISK MEDICATION: ICD-10-CM

## 2024-10-17 DIAGNOSIS — M06.9 RHEUMATOID ARTHRITIS INVOLVING MULTIPLE SITES, UNSPECIFIED WHETHER RHEUMATOID FACTOR PRESENT (HCC): Primary | ICD-10-CM

## 2024-10-17 LAB
ALBUMIN SERPL BCG-MCNC: 4.3 G/DL (ref 3.5–5.1)
ALP SERPL-CCNC: 87 U/L (ref 38–126)
ALT SERPL W/O P-5'-P-CCNC: 21 U/L (ref 11–66)
ANION GAP SERPL CALC-SCNC: 17 MEQ/L (ref 8–16)
AST SERPL-CCNC: 14 U/L (ref 5–40)
BASOPHILS ABSOLUTE: 0 THOU/MM3 (ref 0–0.1)
BASOPHILS NFR BLD AUTO: 0.3 %
BILIRUB SERPL-MCNC: 0.2 MG/DL (ref 0.3–1.2)
BUN SERPL-MCNC: 18 MG/DL (ref 7–22)
CALCIUM SERPL-MCNC: 9.4 MG/DL (ref 8.5–10.5)
CHLORIDE SERPL-SCNC: 104 MEQ/L (ref 98–111)
CO2 SERPL-SCNC: 26 MEQ/L (ref 23–33)
CREAT SERPL-MCNC: 0.8 MG/DL (ref 0.4–1.2)
CRP SERPL-MCNC: < 0.3 MG/DL (ref 0–1)
DEPRECATED RDW RBC AUTO: 41.3 FL (ref 35–45)
EOSINOPHIL NFR BLD AUTO: 0 %
EOSINOPHILS ABSOLUTE: 0 THOU/MM3 (ref 0–0.4)
ERYTHROCYTE [DISTWIDTH] IN BLOOD BY AUTOMATED COUNT: 13 % (ref 11.5–14.5)
ERYTHROCYTE [SEDIMENTATION RATE] IN BLOOD BY WESTERGREN METHOD: 8 MM/HR (ref 0–20)
GFR SERPL CREATININE-BSD FRML MDRD: 88 ML/MIN/1.73M2
GLUCOSE SERPL-MCNC: 107 MG/DL (ref 70–108)
HCT VFR BLD AUTO: 41.1 % (ref 37–47)
HGB BLD-MCNC: 13.5 GM/DL (ref 12–16)
IMM GRANULOCYTES # BLD AUTO: 0.03 THOU/MM3 (ref 0–0.07)
IMM GRANULOCYTES NFR BLD AUTO: 0.4 %
LYMPHOCYTES ABSOLUTE: 1.2 THOU/MM3 (ref 1–4.8)
LYMPHOCYTES NFR BLD AUTO: 16.2 %
MCH RBC QN AUTO: 28.7 PG (ref 26–33)
MCHC RBC AUTO-ENTMCNC: 32.8 GM/DL (ref 32.2–35.5)
MCV RBC AUTO: 87.3 FL (ref 81–99)
MONOCYTES ABSOLUTE: 0.6 THOU/MM3 (ref 0.4–1.3)
MONOCYTES NFR BLD AUTO: 7.5 %
NEUTROPHILS ABSOLUTE: 5.7 THOU/MM3 (ref 1.8–7.7)
NEUTROPHILS NFR BLD AUTO: 75.6 %
NRBC BLD AUTO-RTO: 0 /100 WBC
PLATELET # BLD AUTO: 334 THOU/MM3 (ref 130–400)
PMV BLD AUTO: 10.7 FL (ref 9.4–12.4)
POTASSIUM SERPL-SCNC: 3.7 MEQ/L (ref 3.5–5.2)
PROT SERPL-MCNC: 7 G/DL (ref 6.1–8)
RBC # BLD AUTO: 4.71 MILL/MM3 (ref 4.2–5.4)
SODIUM SERPL-SCNC: 147 MEQ/L (ref 135–145)
WBC # BLD AUTO: 7.5 THOU/MM3 (ref 4.8–10.8)

## 2024-10-17 PROCEDURE — 99214 OFFICE O/P EST MOD 30 MIN: CPT | Performed by: NURSE PRACTITIONER

## 2024-10-17 RX ORDER — PREDNISONE 20 MG/1
TABLET ORAL
COMMUNITY
Start: 2024-10-14

## 2024-10-17 RX ORDER — CELECOXIB 200 MG/1
CAPSULE ORAL
COMMUNITY
Start: 2024-10-14

## 2024-10-17 RX ORDER — HYDROXYCHLOROQUINE SULFATE 200 MG/1
200 TABLET, FILM COATED ORAL 2 TIMES DAILY
Qty: 60 TABLET | Refills: 5 | Status: SHIPPED | OUTPATIENT
Start: 2024-10-17 | End: 2025-04-15

## 2024-10-17 ASSESSMENT — ENCOUNTER SYMPTOMS
SHORTNESS OF BREATH: 0
EYE PAIN: 0
COLOR CHANGE: 0
BLOOD IN STOOL: 0
BACK PAIN: 0
ABDOMINAL PAIN: 0
DIARRHEA: 0
TROUBLE SWALLOWING: 0

## 2024-10-17 NOTE — PROGRESS NOTES
BILITOT 0.4 04/10/2024    ALKPHOS 72 04/10/2024    AST 15 04/10/2024    ALT 12 04/10/2024    LABGLOM >90 04/10/2024     Lab Results   Component Value Date    SEDRATE 11 04/10/2024      Lab Results   Component Value Date    CRP 0.33 04/10/2024     Lab Results   Component Value Date    ANASCRN None Detected 11/18/2019     Lab Results   Component Value Date    HEPAIGM Negative 07/18/2016    HEPBIGM Negative 07/18/2016    HEPBCAB Negative 11/18/2019    HEPCAB Negative 11/18/2019      Lab Results   Component Value Date/Time    QNTTBGOLD Negative 09/01/2020 02:24 PM    QNTTB1 0.00 09/01/2020 02:24 PM    QNTTB2 0.00 09/01/2020 02:24 PM    TBGOLDPLUS 9.73 09/01/2020 02:24 PM    QFTNIL 0.01 09/01/2020 02:24 PM     Lab Results   Component Value Date/Time    RF < 10 11/18/2019 03:35 PM      Lab Results   Component Value Date    URICACID 6.8 (H) 11/18/2019     RADIOLOGY:   None     RAPID3 Composite Score = MDHAQ (0-10) + Patient pain VAS (0-10): + Patient global assessment VAS (0-10):     10/5/23  --- RAPID 3: 3.3 + 4 + 2 = 9.3   1/11/24  --- RAPID 3: 1 + 2 + 5 = 8   4/11/24  --- RAPID 3: 1.7 + 4 + 7 = 12.7   10/17/24  --- RAPID 3: 2 + 1.5 + 1.5 = 5     Remission: <3  Low Disease: <6   Moderate Disease: >=6 & <=12  High Disease: >12    IMPRESSION/RECOMMENDATIONS:      1. Seronegative rheumatoid arthritis: stable with low disease activity  -- Continue hydroxychloroquine 200 mg BID- refills sent  -- Continue leflunomide 20 mg daily- will send refills once labs are received & if they are normal      2. High risk med requiring close monitoring:  -- LEF: monitor CBC and CMP every 3 months- patient will get labs done today    Return in about 6 months (around 4/17/2025).     Orders Placed This Encounter   Procedures    CBC with Auto Differential     Standing Status:   Future     Standing Expiration Date:   10/17/2025    Comprehensive Metabolic Panel     Standing Status:   Future     Standing Expiration Date:   10/17/2025    C-Reactive

## 2024-11-05 ENCOUNTER — OFFICE VISIT (OUTPATIENT)
Dept: FAMILY MEDICINE CLINIC | Age: 54
End: 2024-11-05
Payer: MEDICAID

## 2024-11-05 VITALS
OXYGEN SATURATION: 97 % | HEART RATE: 83 BPM | TEMPERATURE: 97.8 F | RESPIRATION RATE: 16 BRPM | WEIGHT: 225 LBS | BODY MASS INDEX: 37.49 KG/M2 | SYSTOLIC BLOOD PRESSURE: 132 MMHG | DIASTOLIC BLOOD PRESSURE: 82 MMHG | HEIGHT: 65 IN

## 2024-11-05 DIAGNOSIS — E87.6 HYPOKALEMIA: ICD-10-CM

## 2024-11-05 DIAGNOSIS — I10 ESSENTIAL HYPERTENSION: ICD-10-CM

## 2024-11-05 DIAGNOSIS — E83.42 HYPOMAGNESEMIA: Primary | ICD-10-CM

## 2024-11-05 LAB
ALBUMIN SERPL BCG-MCNC: 4.1 G/DL (ref 3.5–5.1)
ANION GAP SERPL CALC-SCNC: 13 MEQ/L (ref 8–16)
BUN SERPL-MCNC: 15 MG/DL (ref 7–22)
CALCIUM SERPL-MCNC: 8.9 MG/DL (ref 8.5–10.5)
CHLORIDE SERPL-SCNC: 105 MEQ/L (ref 98–111)
CO2 SERPL-SCNC: 24 MEQ/L (ref 23–33)
CREAT SERPL-MCNC: 0.7 MG/DL (ref 0.4–1.2)
GFR SERPL CREATININE-BSD FRML MDRD: > 90 ML/MIN/1.73M2
GLUCOSE SERPL-MCNC: 124 MG/DL (ref 70–108)
MAGNESIUM SERPL-MCNC: 1.1 MG/DL (ref 1.6–2.4)
PHOSPHATE SERPL-MCNC: 2.9 MG/DL (ref 2.4–4.7)
POTASSIUM SERPL-SCNC: 3.4 MEQ/L (ref 3.5–5.2)
SODIUM SERPL-SCNC: 142 MEQ/L (ref 135–145)

## 2024-11-05 PROCEDURE — 3079F DIAST BP 80-89 MM HG: CPT | Performed by: NURSE PRACTITIONER

## 2024-11-05 PROCEDURE — 36415 COLL VENOUS BLD VENIPUNCTURE: CPT | Performed by: NURSE PRACTITIONER

## 2024-11-05 PROCEDURE — 3075F SYST BP GE 130 - 139MM HG: CPT | Performed by: NURSE PRACTITIONER

## 2024-11-05 PROCEDURE — 99214 OFFICE O/P EST MOD 30 MIN: CPT | Performed by: NURSE PRACTITIONER

## 2024-11-05 RX ORDER — HYDROCHLOROTHIAZIDE 25 MG/1
25 TABLET ORAL DAILY
Qty: 90 TABLET | Refills: 2 | Status: SHIPPED | OUTPATIENT
Start: 2024-11-05

## 2024-11-05 RX ORDER — METOPROLOL SUCCINATE 50 MG/1
50 TABLET, EXTENDED RELEASE ORAL DAILY
Qty: 30 TABLET | Refills: 5 | Status: SHIPPED | OUTPATIENT
Start: 2024-11-05

## 2024-11-05 SDOH — ECONOMIC STABILITY: FOOD INSECURITY: WITHIN THE PAST 12 MONTHS, YOU WORRIED THAT YOUR FOOD WOULD RUN OUT BEFORE YOU GOT MONEY TO BUY MORE.: NEVER TRUE

## 2024-11-05 SDOH — ECONOMIC STABILITY: FOOD INSECURITY: WITHIN THE PAST 12 MONTHS, THE FOOD YOU BOUGHT JUST DIDN'T LAST AND YOU DIDN'T HAVE MONEY TO GET MORE.: NEVER TRUE

## 2024-11-05 SDOH — ECONOMIC STABILITY: INCOME INSECURITY: HOW HARD IS IT FOR YOU TO PAY FOR THE VERY BASICS LIKE FOOD, HOUSING, MEDICAL CARE, AND HEATING?: NOT HARD AT ALL

## 2024-11-05 ASSESSMENT — ENCOUNTER SYMPTOMS
RESPIRATORY NEGATIVE: 1
EYES NEGATIVE: 1
GASTROINTESTINAL NEGATIVE: 1

## 2024-11-05 NOTE — PROGRESS NOTES
Venipuncture obtained from  left hand. Patient tolerated the procedure without complications or complaints.  
Hormone; Future  -     hydroCHLOROthiazide (HYDRODIURIL) 25 MG tablet; Take 1 tablet by mouth daily, Disp-90 tablet, R-2Normal    Return in about 4 weeks (around 12/3/2024).       Subjective   History of Present Illness  The patient presents for evaluation of high blood pressure.    She reports experiencing severe headaches, dizziness, and a sensation of her heart racing. Two weeks ago, during a visit to her podiatrist, her blood pressure was recorded at 183. She has been monitoring her blood pressure at home and noted a reading of 178/120 two days ago. She took an additional dose of her medication last night due to the elevated reading. This morning, she felt lightheaded and experienced excessive sweating at work, which she attributes to her high blood pressure. She is not experiencing any chest pain or shortness of breath and does not have cold sweats during physical exertion.    She is currently taking leflunomide, prescribed by her rheumatologist, and had been on prednisone for a week as per her podiatrist's advice. However, she discontinued the prednisone last week due to concerns about its potential to increase blood pressure. She does not have leg swelling but reports ankle swelling.    Additionally, she experienced numbness in her neck and tingling in her cheeks yesterday, which she believes may be due to low magnesium and potassium levels. She has increased her intake of these supplements in response. She has stopped taking her diuretic medication as advised.    Review of Systems   Constitutional: Negative.    HENT: Negative.     Eyes: Negative.    Respiratory: Negative.     Cardiovascular: Negative.    Gastrointestinal: Negative.    Musculoskeletal: Negative.    Skin: Negative.    Neurological: Negative.           Objective   Blood pressure 132/82, pulse 83, temperature 97.8 °F (36.6 °C), temperature source Temporal, resp. rate 16, height 1.651 m (5' 5\"), weight 102.1 kg (225 lb), SpO2 97%.  Physical

## 2024-11-06 ENCOUNTER — TELEPHONE (OUTPATIENT)
Dept: FAMILY MEDICINE CLINIC | Age: 54
End: 2024-11-06

## 2024-11-06 DIAGNOSIS — E83.42 HYPOMAGNESEMIA: ICD-10-CM

## 2024-11-06 LAB
FOLLICLE STIMULATING HORMONE: 59 MIU/ML
LUTEINIZING HORMONE: 52.6 MIU/ML (ref 1.7–8.6)

## 2024-11-06 RX ORDER — POTASSIUM CHLORIDE 1500 MG/1
40 TABLET, EXTENDED RELEASE ORAL DAILY
Qty: 60 TABLET | Refills: 4 | Status: SHIPPED | OUTPATIENT
Start: 2024-11-06

## 2024-11-06 RX ORDER — MAGNESIUM OXIDE 400 MG/1
1 TABLET ORAL 3 TIMES DAILY
Qty: 90 TABLET | Refills: 5 | Status: SHIPPED | OUTPATIENT
Start: 2024-11-06

## 2024-11-06 NOTE — TELEPHONE ENCOUNTER
Please call pt.  Her magnesium and potassium is low.  Increase magnesium to 1 tab tid and keep potassium at twice a day.  Recheck electrolytes before I see her again

## 2024-12-13 DIAGNOSIS — Z79.899 ENCOUNTER FOR LONG-TERM (CURRENT) USE OF HIGH-RISK MEDICATION: ICD-10-CM

## 2024-12-13 DIAGNOSIS — M06.9 RHEUMATOID ARTHRITIS INVOLVING MULTIPLE SITES, UNSPECIFIED WHETHER RHEUMATOID FACTOR PRESENT (HCC): ICD-10-CM

## 2024-12-16 RX ORDER — LEFLUNOMIDE 20 MG/1
20 TABLET ORAL DAILY
Qty: 30 TABLET | Refills: 2 | Status: SHIPPED | OUTPATIENT
Start: 2024-12-16

## 2024-12-16 NOTE — TELEPHONE ENCOUNTER
DOLV: 10/14/24  DONV: 4/17/25  LAST LAB DRAW: 10/17/24  LAST TB TEST: 9/1/2020    Lab Results   Component Value Date     11/05/2024    K 3.4 (L) 11/05/2024     11/05/2024    CO2 24 11/05/2024    BUN 15 11/05/2024    CREATININE 0.7 11/05/2024    GLUCOSE 124 (H) 11/05/2024    CALCIUM 8.9 11/05/2024    BILITOT 0.2 (L) 10/17/2024    ALKPHOS 87 10/17/2024    AST 14 10/17/2024    ALT 21 10/17/2024    LABGLOM > 90 11/05/2024       No results for input(s): \"WBC\", \"HGB\", \"HCT\", \"MCV\", \"PLT\" in the last 720 hours.    Lab Results   Component Value Date    SEDRATE 8 10/17/2024       Lab Results   Component Value Date    CRP < 0.30 10/17/2024

## 2024-12-17 DIAGNOSIS — E11.9 TYPE 2 DIABETES MELLITUS WITHOUT COMPLICATION, WITHOUT LONG-TERM CURRENT USE OF INSULIN (HCC): ICD-10-CM

## 2024-12-17 RX ORDER — LIRAGLUTIDE 6 MG/ML
INJECTION SUBCUTANEOUS
Qty: 6 ML | Refills: 3 | Status: SHIPPED | OUTPATIENT
Start: 2024-12-17

## 2025-03-10 ENCOUNTER — PATIENT MESSAGE (OUTPATIENT)
Dept: FAMILY MEDICINE CLINIC | Age: 55
End: 2025-03-10

## 2025-03-10 DIAGNOSIS — E83.42 HYPOMAGNESEMIA: Primary | ICD-10-CM

## 2025-03-11 ENCOUNTER — HOSPITAL ENCOUNTER (OUTPATIENT)
Dept: MRI IMAGING | Age: 55
Discharge: HOME | End: 2025-03-11
Payer: MEDICAID

## 2025-03-11 DIAGNOSIS — S86.011A STRAIN OF RIGHT ACHILLES TENDON, INITIAL ENCOUNTER: ICD-10-CM

## 2025-03-11 DIAGNOSIS — M25.571 ARTHRALGIA OF RIGHT ANKLE: ICD-10-CM

## 2025-03-11 PROCEDURE — 73721 MRI JNT OF LWR EXTRE W/O DYE: CPT

## 2025-03-12 ENCOUNTER — LAB (OUTPATIENT)
Dept: LAB | Age: 55
End: 2025-03-12

## 2025-03-16 ENCOUNTER — TRANSCRIBE ORDERS (OUTPATIENT)
Dept: ADMINISTRATIVE | Age: 55
End: 2025-03-16

## 2025-03-16 DIAGNOSIS — Z12.31 ENCOUNTER FOR SCREENING MAMMOGRAM FOR MALIGNANT NEOPLASM OF BREAST: Primary | ICD-10-CM

## 2025-03-25 LAB — CYTOLOGY THIN PREP PAP: NORMAL

## 2025-04-03 DIAGNOSIS — M06.9 RHEUMATOID ARTHRITIS INVOLVING MULTIPLE SITES, UNSPECIFIED WHETHER RHEUMATOID FACTOR PRESENT (HCC): ICD-10-CM

## 2025-04-03 DIAGNOSIS — Z79.899 ENCOUNTER FOR LONG-TERM (CURRENT) USE OF HIGH-RISK MEDICATION: ICD-10-CM

## 2025-04-03 RX ORDER — LEFLUNOMIDE 20 MG/1
20 TABLET ORAL DAILY
Qty: 30 TABLET | Refills: 2 | OUTPATIENT
Start: 2025-04-03

## 2025-04-17 ENCOUNTER — LAB (OUTPATIENT)
Dept: LAB | Age: 55
End: 2025-04-17

## 2025-04-17 ENCOUNTER — TELEPHONE (OUTPATIENT)
Age: 55
End: 2025-04-17

## 2025-04-17 ENCOUNTER — OFFICE VISIT (OUTPATIENT)
Age: 55
End: 2025-04-17
Payer: MEDICAID

## 2025-04-17 VITALS
HEART RATE: 83 BPM | SYSTOLIC BLOOD PRESSURE: 132 MMHG | WEIGHT: 220 LBS | HEIGHT: 65 IN | DIASTOLIC BLOOD PRESSURE: 74 MMHG | OXYGEN SATURATION: 96 % | BODY MASS INDEX: 36.65 KG/M2

## 2025-04-17 DIAGNOSIS — Z79.899 ENCOUNTER FOR LONG-TERM (CURRENT) USE OF HIGH-RISK MEDICATION: ICD-10-CM

## 2025-04-17 DIAGNOSIS — M06.00 SERONEGATIVE RHEUMATOID ARTHRITIS (HCC): Primary | ICD-10-CM

## 2025-04-17 DIAGNOSIS — M06.00 SERONEGATIVE RHEUMATOID ARTHRITIS (HCC): ICD-10-CM

## 2025-04-17 LAB
ALBUMIN SERPL BCG-MCNC: 4.1 G/DL (ref 3.4–4.9)
ALP SERPL-CCNC: 100 U/L (ref 38–126)
ALT SERPL W/O P-5'-P-CCNC: 18 U/L (ref 10–35)
ANION GAP SERPL CALC-SCNC: 14 MEQ/L (ref 8–16)
AST SERPL-CCNC: 21 U/L (ref 10–35)
BASOPHILS ABSOLUTE: 0.1 THOU/MM3 (ref 0–0.1)
BASOPHILS NFR BLD AUTO: 1 %
BILIRUB SERPL-MCNC: 0.3 MG/DL (ref 0.3–1.2)
BUN SERPL-MCNC: 11 MG/DL (ref 8–23)
CALCIUM SERPL-MCNC: 8.5 MG/DL (ref 8.6–10)
CHLORIDE SERPL-SCNC: 99 MEQ/L (ref 98–111)
CO2 SERPL-SCNC: 26 MEQ/L (ref 22–29)
CREAT SERPL-MCNC: 0.9 MG/DL (ref 0.5–0.9)
CRP SERPL-MCNC: 0.31 MG/DL (ref 0–0.5)
DEPRECATED RDW RBC AUTO: 42.8 FL (ref 35–45)
EOSINOPHIL NFR BLD AUTO: 6.1 %
EOSINOPHILS ABSOLUTE: 0.4 THOU/MM3 (ref 0–0.4)
ERYTHROCYTE [DISTWIDTH] IN BLOOD BY AUTOMATED COUNT: 13.2 % (ref 11.5–14.5)
ERYTHROCYTE [SEDIMENTATION RATE] IN BLOOD BY WESTERGREN METHOD: 11 MM/HR (ref 0–20)
GFR SERPL CREATININE-BSD FRML MDRD: 76 ML/MIN/1.73M2
GLUCOSE SERPL-MCNC: 99 MG/DL (ref 74–109)
HCT VFR BLD AUTO: 42.4 % (ref 37–47)
HGB BLD-MCNC: 13.7 GM/DL (ref 12–16)
IMM GRANULOCYTES # BLD AUTO: 0.02 THOU/MM3 (ref 0–0.07)
IMM GRANULOCYTES NFR BLD AUTO: 0.3 %
LYMPHOCYTES ABSOLUTE: 1.7 THOU/MM3 (ref 1–4.8)
LYMPHOCYTES NFR BLD AUTO: 26.6 %
MAGNESIUM SERPL-MCNC: 1.2 MG/DL (ref 1.6–2.6)
MCH RBC QN AUTO: 28.4 PG (ref 26–33)
MCHC RBC AUTO-ENTMCNC: 32.3 GM/DL (ref 32.2–35.5)
MCV RBC AUTO: 88 FL (ref 81–99)
MONOCYTES ABSOLUTE: 0.5 THOU/MM3 (ref 0.4–1.3)
MONOCYTES NFR BLD AUTO: 7.7 %
NEUTROPHILS ABSOLUTE: 3.7 THOU/MM3 (ref 1.8–7.7)
NEUTROPHILS NFR BLD AUTO: 58.3 %
NRBC BLD AUTO-RTO: 0 /100 WBC
PLATELET # BLD AUTO: 316 THOU/MM3 (ref 130–400)
PMV BLD AUTO: 10.2 FL (ref 9.4–12.4)
POTASSIUM SERPL-SCNC: 3.2 MEQ/L (ref 3.5–5.2)
PROT SERPL-MCNC: 7 G/DL (ref 6.4–8.3)
RBC # BLD AUTO: 4.82 MILL/MM3 (ref 4.2–5.4)
SODIUM SERPL-SCNC: 139 MEQ/L (ref 135–145)
WBC # BLD AUTO: 6.3 THOU/MM3 (ref 4.8–10.8)

## 2025-04-17 PROCEDURE — 99213 OFFICE O/P EST LOW 20 MIN: CPT | Performed by: INTERNAL MEDICINE

## 2025-04-17 PROCEDURE — 99214 OFFICE O/P EST MOD 30 MIN: CPT | Performed by: INTERNAL MEDICINE

## 2025-04-17 RX ORDER — CYCLOBENZAPRINE HCL 10 MG
TABLET ORAL
COMMUNITY
Start: 2025-03-27

## 2025-04-17 RX ORDER — LEFLUNOMIDE 20 MG/1
20 TABLET ORAL DAILY
Qty: 30 TABLET | Refills: 2 | Status: SHIPPED | OUTPATIENT
Start: 2025-04-17

## 2025-04-17 RX ORDER — HYDROXYCHLOROQUINE SULFATE 200 MG/1
200 TABLET, FILM COATED ORAL 2 TIMES DAILY
Qty: 60 TABLET | Refills: 5 | Status: SHIPPED | OUTPATIENT
Start: 2025-04-17 | End: 2025-10-14

## 2025-04-17 ASSESSMENT — ENCOUNTER SYMPTOMS
COUGH: 0
NAUSEA: 0
VOMITING: 0
SHORTNESS OF BREATH: 0
ABDOMINAL PAIN: 0

## 2025-04-17 NOTE — PROGRESS NOTES
[facility]   Rheumatology Clinic Note      4/17/2025       CHIEF COMPLAINT:    Chief Complaint   Patient presents with    6 Month Follow-Up     Rheumatoid arthritis involving multiple sites, unspecified whether rheumatoid factor present (HCC)    Other     Pt is tolerating well.            HISTORY OF PRESENT ILLNESS:    54 y.o. female with seronegative rheumatoid arthritis presents for follow up. Presently, she is on hydroxychloroquine 200 mg bid and leflunomide 20 mg daily.    Past med: methotrexate (ineffective)    Underwent surgical repair for right Achilles tendon rupture early 3/14/2025. Has a cast on. Follows with orthopedics at [facility].  Her main concern is pain in the left Achilles area. She is planning to discuss this with orthopedics.    Otherwise, no significant joint pain, joint swelling or prolonged morning stiffness.      Past Medical History:     has a past medical history of Abdominal pain, right upper quadrant, Arthritis, Calculus of gallbladder, Depression, Genetic testing, GERD (gastroesophageal reflux disease), High cholesterol, Hypertension, and Migraine.    Past Surgical History:     has a past surgical history that includes Cholecystectomy, laparoscopic (N/A, 1/17/2020); [facility] US BREAST CYST ASPIRATION LEFT (9/21/2020); and  Breast Fine Needle Aspiration (Left, 09/21/2020).    Current Medications:      Current Outpatient Medications:     cyclobenzaprine (FLEXERIL) 10 MG tablet, , Disp: , Rfl:     hydroxychloroquine (PLAQUENIL) 200 MG tablet, Take 1 tablet by mouth 2 times daily, Disp: 60 tablet, Rfl: 5    leflunomide (ARAVA) 20 MG tablet, Take 1 tablet by mouth daily, Disp: 30 tablet, Rfl: 2    VICTOZA 18 MG/3ML SOPN SC injection, INJECT 0.6MG ONCE DAILY FOR ONE (1) WEEK, THEN 1.2MG DAILY, SUBQ., Disp: 6 mL, Rfl: 3    potassium chloride (KLOR-CON M) 20 MEQ extended release tablet, Take 2 tablets by mouth daily, Disp: 60 tablet, Rfl: 4    magnesium oxide (MAG-OX) 400 MG tablet, Take 1

## 2025-05-08 ENCOUNTER — TELEPHONE (OUTPATIENT)
Dept: FAMILY MEDICINE CLINIC | Age: 55
End: 2025-05-08

## 2025-05-08 ENCOUNTER — HOSPITAL ENCOUNTER (EMERGENCY)
Age: 55
Discharge: HOME | End: 2025-05-08
Attending: EMERGENCY MEDICINE
Payer: MEDICAID

## 2025-05-08 VITALS
HEART RATE: 90 BPM | OXYGEN SATURATION: 95 % | RESPIRATION RATE: 17 BRPM | SYSTOLIC BLOOD PRESSURE: 129 MMHG | DIASTOLIC BLOOD PRESSURE: 84 MMHG | TEMPERATURE: 99.1 F

## 2025-05-08 DIAGNOSIS — E83.42 HYPOMAGNESEMIA: ICD-10-CM

## 2025-05-08 DIAGNOSIS — E87.6 HYPOKALEMIA: Primary | ICD-10-CM

## 2025-05-08 LAB
ALBUMIN SERPL BCP-MCNC: 3.4 GM/DL (ref 3.4–5)
ALP SERPL-CCNC: 90 U/L (ref 46–116)
ALT SERPL W P-5'-P-CCNC: 24 U/L (ref 14–63)
ANION GAP SERPL CALC-SCNC: 9 MEQ/L (ref 8–16)
AST SERPL W P-5'-P-CCNC: 22 U/L (ref 15–37)
BASOPHILS # BLD: 0.7 % (ref 0–3)
BASOPHILS ABSOLUTE: 0 THOU/MM3 (ref 0–0.1)
BILIRUB SERPL-MCNC: 0.4 MG/DL (ref 0.2–1)
BUN SERPL-MCNC: 10 MG/DL (ref 7–18)
CALCIUM SERPL-MCNC: 7.4 MG/DL (ref 8.5–10.1)
CHLORIDE SERPL-SCNC: 102 MEQ/L (ref 98–107)
CO2 SERPL-SCNC: 31 MEQ/L (ref 21–32)
CREAT SERPL-MCNC: 0.9 MG/DL (ref 0.6–1.3)
EOSINOPHILS ABSOLUTE: 0.3 THOU/MM3 (ref 0–0.5)
EOSINOPHILS RELATIVE PERCENT: 4.3 % (ref 0–4)
GFR SERPL CREATININE-BSD FRML MDRD: 76 ML/MIN/1.73M2
GLUCOSE SERPL-MCNC: 113 MG/DL (ref 74–106)
HCT VFR BLD CALC: 38.2 % (ref 37–47)
HEMOGLOBIN: 12.6 GM/DL (ref 12–16)
IMMATURE GRANS (ABS): 0 THOU/MM3 (ref 0–0.07)
IMMATURE GRANULOCYTES %: 0 %
LYMPHOCYTES # BLD AUTO: 33.6 % (ref 15–47)
LYMPHOCYTES ABSOLUTE: 2 THOU/MM3 (ref 1–4.8)
MAGNESIUM SERPL-MCNC: 0.7 MG/DL (ref 1.8–2.4)
MCH RBC QN AUTO: 28.6 PG (ref 26–32)
MCHC RBC AUTO-ENTMCNC: 33 GM/DL (ref 31–35)
MCV RBC AUTO: 86.6 FL (ref 81–99)
MONOCYTES: 0.5 THOU/MM3 (ref 0.3–1.3)
MONOCYTES: 8.6 % (ref 0–12)
NEUTROPHILS ABSOLUTE: 3.2 THOU/MM3 (ref 1.8–7.7)
PDW BLD-RTO: 12.9 % (ref 11.5–14.9)
PLATELET # BLD AUTO: 279 THOU/MM3 (ref 130–400)
PMV BLD AUTO: 9.4 FL (ref 9.4–12.4)
POTASSIUM SERPL-SCNC: 2.6 MEQ/L (ref 3.5–5.1)
PROT SERPL-MCNC: 6.6 GM/DL (ref 6.4–8.2)
RBC # BLD: 4.41 MILL/MM3 (ref 4.1–5.3)
SEG NEUTROPHILS: 52.6 % (ref 43–75)
SODIUM SERPL-SCNC: 142 MEQ/L (ref 136–145)
WBC # BLD: 6.1 THOU/MM3 (ref 4.8–10.8)

## 2025-05-08 PROCEDURE — 6370000000 HC RX 637 (ALT 250 FOR IP): Performed by: EMERGENCY MEDICINE

## 2025-05-08 PROCEDURE — 96365 THER/PROPH/DIAG IV INF INIT: CPT

## 2025-05-08 PROCEDURE — 80053 COMPREHEN METABOLIC PANEL: CPT

## 2025-05-08 PROCEDURE — 83735 ASSAY OF MAGNESIUM: CPT

## 2025-05-08 PROCEDURE — 6360000002 HC RX W HCPCS: Performed by: EMERGENCY MEDICINE

## 2025-05-08 PROCEDURE — 96366 THER/PROPH/DIAG IV INF ADDON: CPT

## 2025-05-08 PROCEDURE — 99284 EMERGENCY DEPT VISIT MOD MDM: CPT

## 2025-05-08 PROCEDURE — 85025 COMPLETE CBC W/AUTO DIFF WBC: CPT

## 2025-05-08 PROCEDURE — 96368 THER/DIAG CONCURRENT INF: CPT

## 2025-05-08 RX ORDER — MAGNESIUM SULFATE 1 G/100ML
1000 INJECTION INTRAVENOUS ONCE
Status: COMPLETED | OUTPATIENT
Start: 2025-05-08 | End: 2025-05-08

## 2025-05-08 RX ORDER — POTASSIUM CHLORIDE 7.45 MG/ML
10 INJECTION INTRAVENOUS
Status: COMPLETED | OUTPATIENT
Start: 2025-05-08 | End: 2025-05-08

## 2025-05-08 RX ORDER — POTASSIUM CHLORIDE 750 MG/1
40 TABLET, EXTENDED RELEASE ORAL ONCE
Status: COMPLETED | OUTPATIENT
Start: 2025-05-08 | End: 2025-05-08

## 2025-05-08 RX ADMIN — MAGNESIUM SULFATE HEPTAHYDRATE 1000 MG: 1 INJECTION, SOLUTION INTRAVENOUS at 13:05

## 2025-05-08 RX ADMIN — POTASSIUM CHLORIDE 10 MEQ: 7.46 INJECTION, SOLUTION INTRAVENOUS at 13:17

## 2025-05-08 RX ADMIN — POTASSIUM CHLORIDE 10 MEQ: 7.46 INJECTION, SOLUTION INTRAVENOUS at 14:13

## 2025-05-08 RX ADMIN — POTASSIUM CHLORIDE 10 MEQ: 7.46 INJECTION, SOLUTION INTRAVENOUS at 16:24

## 2025-05-08 RX ADMIN — POTASSIUM CHLORIDE 10 MEQ: 7.46 INJECTION, SOLUTION INTRAVENOUS at 15:16

## 2025-05-08 RX ADMIN — POTASSIUM CHLORIDE 40 MEQ: 750 TABLET, EXTENDED RELEASE ORAL at 13:05

## 2025-05-08 ASSESSMENT — PAIN - FUNCTIONAL ASSESSMENT: PAIN_FUNCTIONAL_ASSESSMENT: NONE - DENIES PAIN

## 2025-05-08 NOTE — TELEPHONE ENCOUNTER
Patient called requesting appt today-no openings.  Patient c/o body feeling numb and tingling, hands are cramping.  Patient states she had labs drawn through rheumatologist and her magnesium and potassium were low.  Patient is in [place] and asking if she should go to ambulatory care.  States last time she went there they put her on IV mag/potassium to help bring levels up.  Please advise

## 2025-05-08 NOTE — ED NOTES
Patient presents today with complaints of numbness, tingling and cramping in extremities.  She reports having lab work done and having low potassium and magnesium.  Labs were done 4/17 but symptoms began today.  She held her hydrochlorothiazide yesterday.  Denies any chest pain or shortness breath.

## 2025-05-08 NOTE — ED PROVIDER NOTES
[facility] Emergency Department  [address]  [facility] [address]  Phone: [phone]  EMERGENCY DEPARTMENT ENCOUNTER      Pt Name: Amie De Santiago  MRN: [id number]  Birthdate 1970  Date of evaluation: 5/8/2025  Provider: Charles Poole MD    CHIEF COMPLAINT       Potassium low tingling        HISTORY OF PRESENT ILLNESS      Amie De Santiago is a 55 y.o. female who presents to the emergency department with above noted complaint.  Patient has been doing fine.  She has had some issues with low potassium in past.  She gets tingling and cramps in body feeling problems and her potassium recently was around 3.2.  Her magnesium was 1.1 though according to her.  She called her doctor and feeling poorly and they recommend come in the hospital for further evaluation        REVIEW OF SYSTEMS     Positive for numbness tingling  Review of Systems  All systems negative except as marked.     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    GERD (gastroesophageal reflux disease)     High cholesterol     Hypertension     Migraine          SURGICAL HISTORY       Past Surgical History:   Procedure Laterality Date    CHOLECYSTECTOMY, LAPAROSCOPIC N/A 1/17/2020    ROBOT LAP ROSHAN, UMBILICAL HERNIA REPAIR performed by Anahy Kimbrough MD at [facility] OR    [facility] US BREAST CYST ASPIRATION LEFT  9/21/2020    [facility] US BREAST CYST ASPIRATION LEFT 9/21/2020 [facility]    US BREAST FINE NEEDLE ASPIRATION Left 09/21/2020         CURRENT MEDICATIONS       Current Discharge Medication List        CONTINUE these medications which have NOT CHANGED    Details   cyclobenzaprine (FLEXERIL) 10 MG tablet       hydroxychloroquine (PLAQUENIL) 200 MG tablet Take 1  are mis-transcribed.)    Charles Poole MD (electronically signed)  Attending Emergency Physician                       Charles Poole MD  05/08/25 [id number]

## 2025-05-08 NOTE — TELEPHONE ENCOUNTER
Left detailed message for patient     See ER encounter from [facility].  [facility]

## 2025-05-08 NOTE — DISCHARGE INSTRUCTIONS
Stop taking your hydrochlorothiazide/HCTZ.  Continue magnesium and potassium as priorly prescribed.  Call your primary care doctor today to discuss further plan and follow-up.  They will want to repeat your labs.  You may need additional blood pressure medication after stopping your water pill.

## 2025-05-16 ENCOUNTER — HOSPITAL ENCOUNTER (OUTPATIENT)
Age: 55
Discharge: HOME | End: 2025-05-16
Payer: MEDICAID

## 2025-05-16 ENCOUNTER — HOSPITAL ENCOUNTER (OUTPATIENT)
Dept: MAMMOGRAPHY | Age: 55
Discharge: HOME | End: 2025-05-16
Payer: MEDICAID

## 2025-05-16 DIAGNOSIS — E83.42 HYPOMAGNESEMIA: ICD-10-CM

## 2025-05-16 DIAGNOSIS — Z12.31 ENCOUNTER FOR SCREENING MAMMOGRAM FOR MALIGNANT NEOPLASM OF BREAST: ICD-10-CM

## 2025-05-16 LAB
ALBUMIN SERPL BCG-MCNC: 4.2 G/DL (ref 3.4–4.9)
ALP SERPL-CCNC: 94 U/L (ref 38–126)
ALT SERPL W/O P-5'-P-CCNC: 25 U/L (ref 10–35)
ANION GAP SERPL CALC-SCNC: 13 MEQ/L (ref 8–16)
AST SERPL-CCNC: 27 U/L (ref 10–35)
BASOPHILS ABSOLUTE: 0 THOU/MM3 (ref 0–0.1)
BASOPHILS NFR BLD AUTO: 0.6 %
BILIRUB SERPL-MCNC: 0.3 MG/DL (ref 0.3–1.2)
BUN SERPL-MCNC: 12 MG/DL (ref 8–23)
CALCIUM SERPL-MCNC: 9.2 MG/DL (ref 8.6–10)
CHLORIDE SERPL-SCNC: 102 MEQ/L (ref 98–111)
CO2 SERPL-SCNC: 24 MEQ/L (ref 22–29)
CREAT SERPL-MCNC: 0.8 MG/DL (ref 0.5–0.9)
DEPRECATED RDW RBC AUTO: 43.7 FL (ref 35–45)
EOSINOPHIL NFR BLD AUTO: 4.5 %
EOSINOPHILS ABSOLUTE: 0.2 THOU/MM3 (ref 0–0.4)
ERYTHROCYTE [DISTWIDTH] IN BLOOD BY AUTOMATED COUNT: 13.3 % (ref 11.5–14.5)
GFR SERPL CREATININE-BSD FRML MDRD: 87 ML/MIN/1.73M2
GLUCOSE SERPL-MCNC: 96 MG/DL (ref 74–109)
HCT VFR BLD AUTO: 42.8 % (ref 37–47)
HGB BLD-MCNC: 13.6 GM/DL (ref 12–16)
IMM GRANULOCYTES # BLD AUTO: 0.01 THOU/MM3 (ref 0–0.07)
IMM GRANULOCYTES NFR BLD AUTO: 0.2 %
LYMPHOCYTES ABSOLUTE: 1.6 THOU/MM3 (ref 1–4.8)
LYMPHOCYTES NFR BLD AUTO: 34.1 %
MAGNESIUM SERPL-MCNC: 1.3 MG/DL (ref 1.6–2.6)
MCH RBC QN AUTO: 28.6 PG (ref 26–33)
MCHC RBC AUTO-ENTMCNC: 31.8 GM/DL (ref 32.2–35.5)
MCV RBC AUTO: 89.9 FL (ref 81–99)
MONOCYTES ABSOLUTE: 0.4 THOU/MM3 (ref 0.4–1.3)
MONOCYTES NFR BLD AUTO: 7.9 %
NEUTROPHILS ABSOLUTE: 2.5 THOU/MM3 (ref 1.8–7.7)
NEUTROPHILS NFR BLD AUTO: 52.7 %
NRBC BLD AUTO-RTO: 0 /100 WBC
PLATELET # BLD AUTO: 333 THOU/MM3 (ref 130–400)
PMV BLD AUTO: 10.3 FL (ref 9.4–12.4)
POTASSIUM SERPL-SCNC: 3.8 MEQ/L (ref 3.5–5.2)
PROT SERPL-MCNC: 7 G/DL (ref 6.4–8.3)
RBC # BLD AUTO: 4.76 MILL/MM3 (ref 4.2–5.4)
SODIUM SERPL-SCNC: 139 MEQ/L (ref 135–145)
WBC # BLD AUTO: 4.7 THOU/MM3 (ref 4.8–10.8)

## 2025-05-16 PROCEDURE — 77063 BREAST TOMOSYNTHESIS BI: CPT

## 2025-05-16 PROCEDURE — 36415 COLL VENOUS BLD VENIPUNCTURE: CPT

## 2025-05-16 PROCEDURE — 85025 COMPLETE CBC W/AUTO DIFF WBC: CPT

## 2025-05-16 PROCEDURE — 80053 COMPREHEN METABOLIC PANEL: CPT

## 2025-05-16 PROCEDURE — 83735 ASSAY OF MAGNESIUM: CPT

## 2025-05-17 ENCOUNTER — RESULTS FOLLOW-UP (OUTPATIENT)
Dept: FAMILY MEDICINE CLINIC | Age: 55
End: 2025-05-17

## 2025-05-17 DIAGNOSIS — E83.42 HYPOMAGNESEMIA: Primary | ICD-10-CM

## 2025-05-19 NOTE — TELEPHONE ENCOUNTER
Please call pt.  Her potassium was ok.  Her magnesium was still slightly low.  Increase magnesium to 2 tabs am, 1 tab noon and 1 tab evening.  Recheck labs in 2 weeks.  I had previously sent a message about causes of low magnesium and potassium.  Has she been able to cut back on her omeprazole?  With stopping her hctz how has her bp been.

## 2025-06-01 DIAGNOSIS — K21.00 GASTROESOPHAGEAL REFLUX DISEASE WITH ESOPHAGITIS, UNSPECIFIED WHETHER HEMORRHAGE: ICD-10-CM

## 2025-06-01 DIAGNOSIS — K20.0 EOSINOPHILIC ESOPHAGITIS: ICD-10-CM

## 2025-06-02 ENCOUNTER — LAB (OUTPATIENT)
Dept: LAB | Age: 55
End: 2025-06-02

## 2025-06-02 ENCOUNTER — OFFICE VISIT (OUTPATIENT)
Dept: FAMILY MEDICINE CLINIC | Age: 55
End: 2025-06-02
Payer: MEDICAID

## 2025-06-02 VITALS
RESPIRATION RATE: 15 BRPM | HEART RATE: 76 BPM | SYSTOLIC BLOOD PRESSURE: 122 MMHG | TEMPERATURE: 98.1 F | WEIGHT: 234 LBS | DIASTOLIC BLOOD PRESSURE: 80 MMHG | BODY MASS INDEX: 38.94 KG/M2

## 2025-06-02 DIAGNOSIS — E87.6 HYPOKALEMIA: ICD-10-CM

## 2025-06-02 DIAGNOSIS — Z23 NEED FOR PNEUMOCOCCAL 20-VALENT CONJUGATE VACCINATION: ICD-10-CM

## 2025-06-02 DIAGNOSIS — E11.9 TYPE 2 DIABETES MELLITUS WITHOUT COMPLICATION, WITHOUT LONG-TERM CURRENT USE OF INSULIN (HCC): Primary | ICD-10-CM

## 2025-06-02 DIAGNOSIS — M06.9 RHEUMATOID ARTHRITIS INVOLVING MULTIPLE SITES, UNSPECIFIED WHETHER RHEUMATOID FACTOR PRESENT (HCC): ICD-10-CM

## 2025-06-02 DIAGNOSIS — K21.00 GASTROESOPHAGEAL REFLUX DISEASE WITH ESOPHAGITIS, UNSPECIFIED WHETHER HEMORRHAGE: ICD-10-CM

## 2025-06-02 DIAGNOSIS — E83.42 HYPOMAGNESEMIA: ICD-10-CM

## 2025-06-02 DIAGNOSIS — I10 ESSENTIAL HYPERTENSION: ICD-10-CM

## 2025-06-02 DIAGNOSIS — K20.0 EOSINOPHILIC ESOPHAGITIS: ICD-10-CM

## 2025-06-02 DIAGNOSIS — E11.9 TYPE 2 DIABETES MELLITUS WITHOUT COMPLICATION, WITHOUT LONG-TERM CURRENT USE OF INSULIN (HCC): ICD-10-CM

## 2025-06-02 DIAGNOSIS — Z79.899 ENCOUNTER FOR LONG-TERM (CURRENT) USE OF HIGH-RISK MEDICATION: ICD-10-CM

## 2025-06-02 LAB
ALBUMIN SERPL BCG-MCNC: 4.3 G/DL (ref 3.4–4.9)
ALP SERPL-CCNC: 103 U/L (ref 38–126)
ALT SERPL W/O P-5'-P-CCNC: 29 U/L (ref 10–35)
ANION GAP SERPL CALC-SCNC: 12 MEQ/L (ref 8–16)
AST SERPL-CCNC: 23 U/L (ref 10–35)
BASOPHILS ABSOLUTE: 0 THOU/MM3 (ref 0–0.1)
BASOPHILS NFR BLD AUTO: 0.8 %
BILIRUB SERPL-MCNC: 0.3 MG/DL (ref 0.3–1.2)
BUN SERPL-MCNC: 10 MG/DL (ref 8–23)
CALCIUM SERPL-MCNC: 9.6 MG/DL (ref 8.6–10)
CHLORIDE SERPL-SCNC: 103 MEQ/L (ref 98–111)
CO2 SERPL-SCNC: 26 MEQ/L (ref 22–29)
CREAT SERPL-MCNC: 0.8 MG/DL (ref 0.5–0.9)
CREAT UR-MCNC: 397 MG/DL
CRP SERPL-MCNC: 0.35 MG/DL (ref 0–0.5)
DEPRECATED RDW RBC AUTO: 44.3 FL (ref 35–45)
EOSINOPHIL NFR BLD AUTO: 3.6 %
EOSINOPHILS ABSOLUTE: 0.2 THOU/MM3 (ref 0–0.4)
ERYTHROCYTE [DISTWIDTH] IN BLOOD BY AUTOMATED COUNT: 13.6 % (ref 11.5–14.5)
GFR SERPL CREATININE-BSD FRML MDRD: 87 ML/MIN/1.73M2
GLUCOSE SERPL-MCNC: 82 MG/DL (ref 74–109)
HCT VFR BLD AUTO: 43.9 % (ref 37–47)
HGB BLD-MCNC: 13.9 GM/DL (ref 12–16)
IMM GRANULOCYTES # BLD AUTO: 0.01 THOU/MM3 (ref 0–0.07)
IMM GRANULOCYTES NFR BLD AUTO: 0.2 %
LYMPHOCYTES ABSOLUTE: 1.7 THOU/MM3 (ref 1–4.8)
LYMPHOCYTES NFR BLD AUTO: 32.2 %
MAGNESIUM SERPL-MCNC: 1.7 MG/DL (ref 1.6–2.6)
MCH RBC QN AUTO: 28.5 PG (ref 26–33)
MCHC RBC AUTO-ENTMCNC: 31.7 GM/DL (ref 32.2–35.5)
MCV RBC AUTO: 90 FL (ref 81–99)
MICROALBUMIN UR-MCNC: 8.34 MG/DL
MICROALBUMIN/CREAT RATIO PNL UR: 21 MG/G (ref 0–30)
MONOCYTES ABSOLUTE: 0.5 THOU/MM3 (ref 0.4–1.3)
MONOCYTES NFR BLD AUTO: 9.1 %
NEUTROPHILS ABSOLUTE: 2.9 THOU/MM3 (ref 1.8–7.7)
NEUTROPHILS NFR BLD AUTO: 54.1 %
NRBC BLD AUTO-RTO: 0 /100 WBC
PHOSPHATE SERPL-MCNC: 2.6 MG/DL (ref 2.5–4.5)
PLATELET # BLD AUTO: 317 THOU/MM3 (ref 130–400)
PMV BLD AUTO: 10.4 FL (ref 9.4–12.4)
POTASSIUM SERPL-SCNC: 3.8 MEQ/L (ref 3.5–5.2)
PROT SERPL-MCNC: 7.2 G/DL (ref 6.4–8.3)
RBC # BLD AUTO: 4.88 MILL/MM3 (ref 4.2–5.4)
SODIUM SERPL-SCNC: 141 MEQ/L (ref 135–145)
TSH SERPL DL<=0.05 MIU/L-ACNC: 0.92 UIU/ML (ref 0.27–4.2)
WBC # BLD AUTO: 5.3 THOU/MM3 (ref 4.8–10.8)

## 2025-06-02 PROCEDURE — 3079F DIAST BP 80-89 MM HG: CPT | Performed by: NURSE PRACTITIONER

## 2025-06-02 PROCEDURE — 99214 OFFICE O/P EST MOD 30 MIN: CPT | Performed by: NURSE PRACTITIONER

## 2025-06-02 PROCEDURE — 3074F SYST BP LT 130 MM HG: CPT | Performed by: NURSE PRACTITIONER

## 2025-06-02 RX ORDER — ATORVASTATIN CALCIUM 20 MG/1
20 TABLET, FILM COATED ORAL DAILY
Qty: 90 TABLET | Refills: 1 | Status: SHIPPED | OUTPATIENT
Start: 2025-06-02

## 2025-06-02 RX ORDER — LOSARTAN POTASSIUM 100 MG/1
100 TABLET ORAL DAILY
Qty: 90 TABLET | Refills: 1 | Status: SHIPPED | OUTPATIENT
Start: 2025-06-02

## 2025-06-02 RX ORDER — OMEPRAZOLE 40 MG/1
CAPSULE, DELAYED RELEASE ORAL
Qty: 180 CAPSULE | Refills: 1 | Status: SHIPPED | OUTPATIENT
Start: 2025-06-02

## 2025-06-02 SDOH — ECONOMIC STABILITY: FOOD INSECURITY: WITHIN THE PAST 12 MONTHS, YOU WORRIED THAT YOUR FOOD WOULD RUN OUT BEFORE YOU GOT MONEY TO BUY MORE.: NEVER TRUE

## 2025-06-02 SDOH — ECONOMIC STABILITY: FOOD INSECURITY: WITHIN THE PAST 12 MONTHS, THE FOOD YOU BOUGHT JUST DIDN'T LAST AND YOU DIDN'T HAVE MONEY TO GET MORE.: NEVER TRUE

## 2025-06-02 ASSESSMENT — PATIENT HEALTH QUESTIONNAIRE - PHQ9
SUM OF ALL RESPONSES TO PHQ QUESTIONS 1-9: 0
2. FEELING DOWN, DEPRESSED OR HOPELESS: NOT AT ALL
SUM OF ALL RESPONSES TO PHQ QUESTIONS 1-9: 0
1. LITTLE INTEREST OR PLEASURE IN DOING THINGS: NOT AT ALL

## 2025-06-02 ASSESSMENT — ENCOUNTER SYMPTOMS
EYES NEGATIVE: 1
GASTROINTESTINAL NEGATIVE: 1
RESPIRATORY NEGATIVE: 1

## 2025-06-02 NOTE — PROGRESS NOTES
Amie De Santiago is a 55 y.o. female who presents today for :  Chief Complaint   Patient presents with    Discuss Labs    Discuss Medications     Vitals:    06/02/25 1354   BP: 122/80   Pulse: 76   Resp: 15   Temp: 98.1 °F (36.7 °C)       HPI:     History of Present Illness  The patient presents for evaluation of low potassium and magnesium, GERD, rheumatoid arthritis, depression, weight management, diabetes and health maintenance.    She has been experiencing hypokalemia and hypomagnesemia for approximately a year, with a recent exacerbation leading to an emergency room visit. The diuretic component of her treatment was discontinued, resulting in an improvement in her potassium levels, although her magnesium levels remain suboptimal. She reports numbness in her neck and face, and a sensation of paralysis in her fingers, which she is unable to bend.    She has reduced her omeprazole dosage to one tablet every other day, which has effectively managed her reflux symptoms. She recalls a previous episode of choking on food, which necessitated esophageal dilation. However, this procedure did not alleviate her symptoms, prompting an increase in her medication dosage.    Her blood pressure has been well-controlled recently. She is currently on losartan and metoprolol for hypertension.    She reports a general improvement in her overall health but expresses concern about potential overmedication. She has been gradually reducing her fluoxetine dosage from three to two tablets daily, which she tolerates well. She has been on this medication for the past 10 years and is considering further dose reduction.    She acknowledges a need for weight loss, having previously weighed 220 pounds and currently weighing 234 pounds. She has made dietary modifications, including reducing caffeine intake to one cup of coffee in the morning and a soda during the day, and increasing fruit consumption. Despite these efforts, she continues to

## 2025-06-03 ENCOUNTER — RESULTS FOLLOW-UP (OUTPATIENT)
Dept: FAMILY MEDICINE CLINIC | Age: 55
End: 2025-06-03

## 2025-06-03 LAB
DEPRECATED MEAN GLUCOSE BLD GHB EST-ACNC: 105 MG/DL (ref 70–126)
ERYTHROCYTE [SEDIMENTATION RATE] IN BLOOD BY WESTERGREN METHOD: 10 MM/HR (ref 0–20)
HBA1C MFR BLD HPLC: 5.5 % (ref 4–6)

## 2025-06-03 NOTE — TELEPHONE ENCOUNTER
Please let know labs looked great.  Ok to lower her potassium and magnesium by one tablet daily and recheck labs in 2 weeks

## 2025-06-05 LAB — ALDOST SERPL-MCNC: 13.5 NG/DL

## 2025-08-05 ENCOUNTER — PATIENT MESSAGE (OUTPATIENT)
Dept: FAMILY MEDICINE CLINIC | Age: 55
End: 2025-08-05

## 2025-08-14 ENCOUNTER — OFFICE VISIT (OUTPATIENT)
Age: 55
End: 2025-08-14
Payer: MEDICAID

## 2025-08-14 VITALS
DIASTOLIC BLOOD PRESSURE: 84 MMHG | WEIGHT: 236.8 LBS | OXYGEN SATURATION: 99 % | BODY MASS INDEX: 39.45 KG/M2 | HEART RATE: 88 BPM | SYSTOLIC BLOOD PRESSURE: 126 MMHG | HEIGHT: 65 IN

## 2025-08-14 DIAGNOSIS — Z79.899 ENCOUNTER FOR LONG-TERM (CURRENT) USE OF HIGH-RISK MEDICATION: ICD-10-CM

## 2025-08-14 DIAGNOSIS — M06.00 SERONEGATIVE RHEUMATOID ARTHRITIS (HCC): Primary | ICD-10-CM

## 2025-08-14 PROCEDURE — 99214 OFFICE O/P EST MOD 30 MIN: CPT | Performed by: NURSE PRACTITIONER

## 2025-08-14 PROCEDURE — 99213 OFFICE O/P EST LOW 20 MIN: CPT | Performed by: NURSE PRACTITIONER

## 2025-08-14 RX ORDER — DULAGLUTIDE 3 MG/.5ML
3 INJECTION, SOLUTION SUBCUTANEOUS WEEKLY
COMMUNITY

## 2025-08-14 ASSESSMENT — ENCOUNTER SYMPTOMS
BLOOD IN STOOL: 0
COLOR CHANGE: 0
DIARRHEA: 0
SHORTNESS OF BREATH: 0
BACK PAIN: 0
EYE PAIN: 0
TROUBLE SWALLOWING: 0
ABDOMINAL PAIN: 0

## 2025-08-14 ASSESSMENT — JOINT PAIN
TOTAL NUMBER OF TENDER JOINTS: 2
TOTAL NUMBER OF SWOLLEN JOINTS: 0

## 2025-09-04 ENCOUNTER — HOSPITAL ENCOUNTER (OUTPATIENT)
Dept: GENERAL RADIOLOGY | Age: 55
Discharge: HOME | End: 2025-09-04
Payer: MEDICAID

## 2025-09-04 ENCOUNTER — OFFICE VISIT (OUTPATIENT)
Dept: FAMILY MEDICINE CLINIC | Age: 55
End: 2025-09-04
Payer: MEDICAID

## 2025-09-04 VITALS
DIASTOLIC BLOOD PRESSURE: 80 MMHG | BODY MASS INDEX: 38.27 KG/M2 | HEART RATE: 91 BPM | WEIGHT: 230 LBS | SYSTOLIC BLOOD PRESSURE: 130 MMHG | TEMPERATURE: 97.8 F | RESPIRATION RATE: 16 BRPM

## 2025-09-04 DIAGNOSIS — M06.00 SERONEGATIVE RHEUMATOID ARTHRITIS (HCC): ICD-10-CM

## 2025-09-04 DIAGNOSIS — M54.6 ACUTE RIGHT-SIDED THORACIC BACK PAIN: ICD-10-CM

## 2025-09-04 DIAGNOSIS — Z79.899 ENCOUNTER FOR LONG-TERM (CURRENT) USE OF HIGH-RISK MEDICATION: ICD-10-CM

## 2025-09-04 DIAGNOSIS — M54.6 ACUTE RIGHT-SIDED THORACIC BACK PAIN: Primary | ICD-10-CM

## 2025-09-04 PROCEDURE — 72072 X-RAY EXAM THORAC SPINE 3VWS: CPT

## 2025-09-04 PROCEDURE — 99213 OFFICE O/P EST LOW 20 MIN: CPT | Performed by: NURSE PRACTITIONER

## 2025-09-04 RX ORDER — GABAPENTIN 300 MG/1
300 CAPSULE ORAL 3 TIMES DAILY
Qty: 90 CAPSULE | Refills: 5 | Status: SHIPPED | OUTPATIENT
Start: 2025-09-04 | End: 2026-03-03

## 2025-09-04 RX ORDER — HYDROXYCHLOROQUINE SULFATE 200 MG/1
200 TABLET, FILM COATED ORAL 2 TIMES DAILY
Qty: 60 TABLET | Refills: 5 | OUTPATIENT
Start: 2025-09-04 | End: 2026-03-03

## 2025-09-04 RX ORDER — LEFLUNOMIDE 20 MG/1
20 TABLET ORAL DAILY
Qty: 30 TABLET | Refills: 2 | OUTPATIENT
Start: 2025-09-04

## 2025-09-04 RX ORDER — LIDOCAINE 50 MG/G
1 PATCH TOPICAL DAILY
Qty: 10 PATCH | Refills: 0 | Status: SHIPPED | OUTPATIENT
Start: 2025-09-04 | End: 2025-09-14

## 2025-09-04 ASSESSMENT — ENCOUNTER SYMPTOMS
BACK PAIN: 1
GASTROINTESTINAL NEGATIVE: 1
RESPIRATORY NEGATIVE: 1
EYES NEGATIVE: 1

## (undated) DEVICE — SUTURE ETHBND D SPEC NO 0 UR 6 30IN D9436

## (undated) DEVICE — REDUCER: Brand: ENDOWRIST

## (undated) DEVICE — PUMP SUC IRR TBNG L10FT W/ HNDPC ASSEMB STRYKEFLOW 2

## (undated) DEVICE — ROYAL SILK SURGICAL GOWN, XXL: Brand: CONVERTORS

## (undated) DEVICE — GLOVE SURG SZ 6 THK91MIL LTX FREE SYN POLYISOPRENE ANTI

## (undated) DEVICE — GLOVE SURG SZ 65 THK91MIL LTX FREE SYN POLYISOPRENE

## (undated) DEVICE — GLOVE ORANGE PI 8   MSG9080

## (undated) DEVICE — TIP COVER ACCESSORY

## (undated) DEVICE — ELECTROSURGICAL PENCIL BUTTON SWITCH E-Z CLEAN COATED BLADE ELECTRODE 10 FT (3 M) CORD HOLSTER: Brand: MEGADYNE

## (undated) DEVICE — CANNULA SEAL

## (undated) DEVICE — SUTURE VCRL SZ 4-0 L27IN ABSRB UD L19MM FS-2 3/8 CIR REV J422H

## (undated) DEVICE — TROCAR: Brand: KII FIOS FIRST ENTRY

## (undated) DEVICE — COVER ARMBRD W13XL28.5IN IMPERV BLU FOR OP RM

## (undated) DEVICE — BAG SPEC REM 224ML W4XL6IN DIA10MM 1 HND GYN DISP ENDOPCH

## (undated) DEVICE — ELECTRO LUBE IS A SINGLE PATIENT USE DEVICE THAT IS INTENDED TO BE USED ON ELECTROSURGICAL ELECTRODES TO REDUCE STICKING.: Brand: KEY SURGICAL ELECTRO LUBE

## (undated) DEVICE — GOWN,SIRUS,NON REINFRCD,LARGE,SET IN SL: Brand: MEDLINE

## (undated) DEVICE — BLADELESS OBTURATOR: Brand: WECK VISTA

## (undated) DEVICE — SEAL

## (undated) DEVICE — SOLUTION IV 1000ML 0.9% SOD CHL PH 5 INJ USP VIAFLX PLAS

## (undated) DEVICE — SOLUTION ANTIFOG VIS SYS CLEARIFY LAPSCP

## (undated) DEVICE — GENERAL LAPAROSCOPY PACK-LF: Brand: MEDLINE INDUSTRIES, INC.

## (undated) DEVICE — COLUMN DRAPE

## (undated) DEVICE — ARM DRAPE

## (undated) DEVICE — Z DUP USE 2257490 ADHESIVE SKIN CLSRE 036ML TPCL 2CTL CNCRLTE HIGH VSCSTY DRMB

## (undated) DEVICE — BASIC SINGLE BASIN BTC-LF: Brand: MEDLINE INDUSTRIES, INC.

## (undated) DEVICE — TUBING INSUFFLATOR HEAT HUMIDIFIED SMK EVAC SET PNEUMOCLEAR